# Patient Record
Sex: FEMALE | Race: WHITE | Employment: OTHER | URBAN - METROPOLITAN AREA
[De-identification: names, ages, dates, MRNs, and addresses within clinical notes are randomized per-mention and may not be internally consistent; named-entity substitution may affect disease eponyms.]

---

## 2017-02-21 ENCOUNTER — HOSPITAL ENCOUNTER (OUTPATIENT)
Dept: WOMENS IMAGING | Age: 77
Discharge: HOME | End: 2017-02-21
Payer: COMMERCIAL

## 2017-02-21 DIAGNOSIS — Z13.820 OSTEOPOROSIS SCREENING: ICD-10-CM

## 2017-02-21 DIAGNOSIS — Z12.39 SCREENING BREAST EXAMINATION: ICD-10-CM

## 2017-02-21 PROCEDURE — 77080 DXA BONE DENSITY AXIAL: CPT

## 2017-02-21 PROCEDURE — G0202 SCR MAMMO BI INCL CAD: HCPCS

## 2018-04-05 ENCOUNTER — HOSPITAL ENCOUNTER (OUTPATIENT)
Dept: WOMENS IMAGING | Age: 78
Discharge: HOME | End: 2018-04-07
Payer: COMMERCIAL

## 2018-04-05 DIAGNOSIS — Z12.31 ENCOUNTER FOR SCREENING MAMMOGRAM FOR BREAST CANCER: ICD-10-CM

## 2018-04-05 PROCEDURE — 77067 SCR MAMMO BI INCL CAD: CPT

## 2019-02-19 PROBLEM — M51.369 DDD (DEGENERATIVE DISC DISEASE), LUMBAR: Status: ACTIVE | Noted: 2019-02-19

## 2019-02-19 PROBLEM — M47.817 LUMBOSACRAL SPONDYLOSIS WITHOUT MYELOPATHY: Status: ACTIVE | Noted: 2019-02-19

## 2019-02-19 PROBLEM — M51.36 DDD (DEGENERATIVE DISC DISEASE), LUMBAR: Status: ACTIVE | Noted: 2019-02-19

## 2019-02-19 PROBLEM — M47.899 FACET SYNDROME: Status: ACTIVE | Noted: 2019-02-19

## 2019-10-02 ENCOUNTER — HOSPITAL ENCOUNTER (OUTPATIENT)
Dept: WOMENS IMAGING | Age: 79
Discharge: HOME | End: 2019-10-04
Payer: COMMERCIAL

## 2019-10-02 DIAGNOSIS — Z12.31 ENCOUNTER FOR SCREENING MAMMOGRAM FOR BREAST CANCER: ICD-10-CM

## 2019-10-02 PROCEDURE — 77067 SCR MAMMO BI INCL CAD: CPT

## 2021-10-06 ENCOUNTER — HOSPITAL ENCOUNTER (OUTPATIENT)
Dept: WOMENS IMAGING | Age: 81
Discharge: HOME | End: 2021-10-08
Payer: COMMERCIAL

## 2021-10-06 VITALS — BODY MASS INDEX: 30.73 KG/M2 | HEIGHT: 64 IN

## 2021-10-06 DIAGNOSIS — Z12.31 SCREENING MAMMOGRAM FOR HIGH-RISK PATIENT: ICD-10-CM

## 2021-10-06 DIAGNOSIS — Z12.31 BREAST CANCER SCREENING BY MAMMOGRAM: ICD-10-CM

## 2021-10-06 PROCEDURE — 77063 BREAST TOMOSYNTHESIS BI: CPT

## 2021-10-11 ENCOUNTER — HOSPITAL ENCOUNTER (OUTPATIENT)
Dept: ULTRASOUND IMAGING | Age: 81
Discharge: HOME | End: 2021-10-13
Payer: COMMERCIAL

## 2021-10-11 ENCOUNTER — HOSPITAL ENCOUNTER (OUTPATIENT)
Dept: WOMENS IMAGING | Age: 81
Discharge: HOME | End: 2021-10-13
Payer: COMMERCIAL

## 2021-10-11 VITALS — HEIGHT: 64 IN | BODY MASS INDEX: 30.73 KG/M2

## 2021-10-11 DIAGNOSIS — R92.8 ABNORMAL MAMMOGRAM: ICD-10-CM

## 2021-10-11 PROCEDURE — 76642 ULTRASOUND BREAST LIMITED: CPT

## 2021-10-11 PROCEDURE — G0279 TOMOSYNTHESIS, MAMMO: HCPCS

## 2021-10-12 ENCOUNTER — OFFICE VISIT (OUTPATIENT)
Dept: SURGERY | Age: 81
End: 2021-10-12
Payer: COMMERCIAL

## 2021-10-12 VITALS
HEIGHT: 64 IN | TEMPERATURE: 96.8 F | DIASTOLIC BLOOD PRESSURE: 88 MMHG | WEIGHT: 188.4 LBS | RESPIRATION RATE: 18 BRPM | HEART RATE: 98 BPM | SYSTOLIC BLOOD PRESSURE: 162 MMHG | BODY MASS INDEX: 32.17 KG/M2

## 2021-10-12 DIAGNOSIS — E66.9 OBESITY, CLASS I, BMI 30-34.9: ICD-10-CM

## 2021-10-12 DIAGNOSIS — R92.8 ABNORMAL MAMMOGRAM OF RIGHT BREAST: Primary | ICD-10-CM

## 2021-10-12 DIAGNOSIS — N63.10 BREAST MASS, RIGHT: ICD-10-CM

## 2021-10-12 DIAGNOSIS — Z80.3 FAMILY HISTORY OF BREAST CANCER IN SISTER: ICD-10-CM

## 2021-10-12 PROBLEM — E66.811 OBESITY, CLASS I, BMI 30-34.9: Status: ACTIVE | Noted: 2021-10-12

## 2021-10-12 PROCEDURE — 99204 OFFICE O/P NEW MOD 45 MIN: CPT | Performed by: SURGERY

## 2021-10-12 PROCEDURE — 19083 BX BREAST 1ST LESION US IMAG: CPT | Performed by: SURGERY

## 2021-10-12 RX ORDER — LIDOCAINE HYDROCHLORIDE 10 MG/ML
10 INJECTION, SOLUTION INFILTRATION; PERINEURAL ONCE
Status: SHIPPED | OUTPATIENT
Start: 2021-10-12

## 2021-10-12 RX ORDER — EZETIMIBE 10 MG/1
10 TABLET ORAL DAILY
COMMUNITY
Start: 2021-01-29

## 2021-10-12 ASSESSMENT — ENCOUNTER SYMPTOMS
CHEST TIGHTNESS: 0
SHORTNESS OF BREATH: 0
COLOR CHANGE: 0
VOMITING: 0
NAUSEA: 0
COUGH: 0
ABDOMINAL PAIN: 0
SORE THROAT: 0

## 2021-10-12 NOTE — PROGRESS NOTES
NEW BREAST PATIENT         SERVICE DATE: 10/12/21  SERVICE TIME:  10:00 AM EDT    REFERRED BY:  Gamaliel Diaz MD  REASON FOR TODAY'S VISIT:    Chief Complaint   Patient presents with    New Patient    Abnormal Mammogram     BIRADS 5 Right Breast, denies feeling any changes , skin changes, nipple drainage or breast pain bilateral breasts    Abnormal Ultrasound      CHAPERONE WAS OFFERED, PATIENT RESPONDED: no    HISTORY AND CHIEF COMPLAINT:  Mariaa Claros is a 68 y.o.  female who is here for a New Patient, Abnormal Mammogram (BIRADS 5 Right Breast, denies feeling any changes , skin changes, nipple drainage or breast pain bilateral breasts), and Abnormal Ultrasound  she is nervous as her sister has advanced breast cancer. BREAST HISTORY  Her past breast history (prior to this encounter) is as follows: Abnormal mammogram:   Yes, BIRADS 5 Right Breast  Abnormal Breast US:  Yes, BIRADS 5 Right Breast  Breast biopsy:    No  Breast cysts:    No  Breast surgery:    No  Breast cancer              No  History of Breastfeeding: No   Currently Breastfeeding: No      RISK FACTORS FOR BREAST CANCER:  Family History of Breast Cancer: Yes, significant for Sister DX age 67.   History of ovarian cancer: no  Ashkenazi Ancestry: no  Age at the birth of first child: 27  Age at the onset of menses: 14-15  Age at menopause: 48   Hormonal therapy: yes - about 30 years ago for about 6-8 months took Estrace  Postmenopausal obesity: yes, BMI 32.34    BRA SIZE: 38D    Past Medical History:   Diagnosis Date    Chronic back pain     Urinary incontinence      Past Surgical History:   Procedure Laterality Date    SPINE SURGERY       Family History   Problem Relation Age of Onset    Cancer Father     Diabetes Father     Breast Cancer Sister      Social History     Socioeconomic History    Marital status:      Spouse name: Not on file    Number of children: Not on file    Years of education: Not on file   Ramy Dominguez Highest education level: Not on file   Occupational History    Not on file   Tobacco Use    Smoking status: Former Smoker    Smokeless tobacco: Never Used   Vaping Use    Vaping Use: Never used   Substance and Sexual Activity    Alcohol use: No    Drug use: No    Sexual activity: Not on file   Other Topics Concern    Not on file   Social History Narrative    Not on file     Social Determinants of Health     Financial Resource Strain:     Difficulty of Paying Living Expenses:    Food Insecurity:     Worried About Running Out of Food in the Last Year:     [address] in the Last Year:    Transportation Needs:     Lack of Transportation (Medical):  Lack of Transportation (Non-Medical):    Physical Activity:     Days of Exercise per Week:     Minutes of Exercise per Session:    Stress:     Feeling of Stress :    Social Connections:     Frequency of Communication with Friends and Family:     Frequency of Social Gatherings with Friends and Family:     Attends Worship Services:     Active Member of Clubs or Organizations:     Attends Club or Organization Meetings:     Marital Status:    Intimate Partner Violence:     Fear of Current or Ex-Partner:     Emotionally Abused:     Physically Abused:     Sexually Abused:        Review of Systems   Constitutional: Negative for activity change, appetite change, chills, diaphoresis, fatigue, fever and unexpected weight change. HENT: Negative for congestion, ear pain, hearing loss, mouth sores, nosebleeds and sore throat. Respiratory: Negative for cough, chest tightness and shortness of breath. Cardiovascular: Negative for chest pain, palpitations and leg swelling. Gastrointestinal: Negative for abdominal pain, nausea and vomiting. Endocrine: Negative for cold intolerance, heat intolerance, polydipsia, polyphagia and polyuria. Genitourinary: Negative for difficulty urinating, menstrual problem and vaginal bleeding.    Musculoskeletal: Negative for neck pain and neck stiffness. Skin: Negative for color change, pallor, rash and wound. Allergic/Immunologic: Negative for environmental allergies and immunocompromised state. Neurological: Negative for weakness. Hematological: Does not bruise/bleed easily. Psychiatric/Behavioral: Negative for agitation, confusion, sleep disturbance and suicidal ideas. The patient is nervous/anxious (about procedure and results). Have you ever tested positive for AIDS? no  Have you ever tested positive for Hepatitis? 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 She has never used smokeless tobacco.  Alcohol use: Luis Fernando Swartz  reports no history of alcohol use. Drug use: Luis Fernando Swartz  reports no history of drug use. Caffeine intake: 1-2 cups of caffeinated coffee per day(s)  Exercise: Works in yard    BP (!) 162/88   Pulse 98   Temp 96.8 °F (36 °C)   Resp 18   Ht 5' 4\" (1.626 m)   Wt 188 lb 6.4 oz (85.5 kg)   BMI 32.34 kg/m²     Physical Exam  Vitals reviewed. Constitutional:       Appearance: Normal appearance. She is well-developed. HENT:      Head: Normocephalic and atraumatic. Nose: Nose normal.   Eyes:      Conjunctiva/sclera: Conjunctivae normal.      Right eye: No hemorrhage. Left eye: No hemorrhage. Cardiovascular:      Rate and Rhythm: Normal rate. Pulmonary:      Effort: Pulmonary effort is normal. No respiratory distress. Chest:      Breasts: Breasts are symmetrical.         Right: Mass present. No inverted nipple, nipple discharge (slight thickening in uoq, no discrete mass), skin change or tenderness. Left: No inverted nipple, mass, nipple discharge, skin change or tenderness. Musculoskeletal:         General: Normal range of motion. Cervical back: Normal range of motion and neck supple. Comments: Normal Range of motion in upper and lower extremities. Lymphadenopathy:      Cervical: No cervical adenopathy. Right cervical: No superficial, deep or posterior cervical adenopathy. Left cervical: No superficial, deep or posterior cervical adenopathy. Upper Body:      Right upper body: No supraclavicular, axillary or pectoral adenopathy. Left upper body: No supraclavicular, axillary or pectoral adenopathy. Skin:     General: Skin is warm and dry. Findings: No abrasion, bruising, erythema or lesion. Neurological:      Mental Status: She is alert and oriented to person, place, and time. She is not disoriented. Psychiatric:         Mood and Affect: Mood is anxious. Speech: Speech normal.         Behavior: Behavior normal. Behavior is cooperative. Thought Content: Thought content normal.         Judgment: Judgment normal.       RADIOGRAPHIC FINDINGS:    US BREAST LIMITED RIGHT    Result Date: 10/11/2021  EXAMINATION: RIGHT DIGITAL DIAGNOSTIC MAMMOGRAM AND TARGETED RIGHT BREAST SONOGRAM CLINICAL HISTORY:  ABNORMAL SCREENING MAMMOGRAM COMPARISON:  OCTOBER 6, 2021, OCTOBER 2, 2019. FINDINGS: 2-D and 3-D additional sequences included spot compression views in the CC and MLO projection and a 90 degree mediolateral view of the entire right breast. A targeted sonogram was included Nodular density approximately 8 mm in diameter in the outer right breast persisted with additional images. This lies in the upper outer quadrant on the mammogram. Targeted right breast sonogram revealed a 6 x 5 x 4 mm hypoechoic irregular spiculated mass along the 9:00 axis, 6 cm from the nipple. There is shadowing posterior to the mass sonographically. This is highly suspicious for tumor and ultrasound-guided biopsy is recommended. CAD analysis was performed and used in the interpretation. BI-RADS 5: HIGHLY SUGGESTIVE OF MALIGNANCY--APPROPRIATE ACTION SHOULD BE TAKEN.  Board Certified Radiologists. Accredited by the ACR and FDA. MAMMOGRAPHY IS VERY IMPORTANT TO YOUR HEALTH. THE AMERICAN CANCER SOCIETY GUIDELINES RECOMMEND THAT WOMEN 36YEARS OF AGE AND OLDER SHOULD HAVE A MAMMOGRAM EVERY YEAR. A REMINDER LETTER WILL BE SENT AT THE APPROPRIATE TIME. Board Certified Radiologists. Accredited by the ACR and FDA. MAMMOGRAPHY IS VERY IMPORTANT TO YOUR HEALTH. THE AMERICAN CANCER SOCIETY GUIDELINES RECOMMEND THAT WOMEN 36YEARS OF AGE AND OLDER SHOULD HAVE A MAMMOGRAM EVERY YEAR. A REMINDER LETTER WILL BE SENT AT THE APPROPRIATE TIME.      [facility] DIGITAL SCREEN BILATERAL    Result Date: 10/7/2021  EXAMINATION: [facility] DIGITAL SCREEN BILATERAL CLINICAL HISTORY:Z12.31 BREAST CANCER SCREENING BY MAMMOGRAM ICD10 COMPARISON: OCTOBER 2, 2019 FINDINGS:3-D tomosynthesis imaging of the bilateral breasts was performed. There are scattered fibroglandular densities within each breast.  Focal asymmetric mass in the upper outer right breast. Additional mammographic imaging and correlation with ultrasound recommended. . Otherwise there are no suspicious masses, areas of architectural distortion or suspicious areas of microcalcifications. CAD analysis was performed and used in the interpretation. BI-RADS 0: NEED ADDITIONAL IMAGING EVALUATION. Board Certified Radiologists. Accredited by the ACR and FDA. MAMMOGRAPHY IS VERY IMPORTANT TO YOUR HEALTH. THE AMERICAN CANCER SOCIETY GUIDELINES RECOMMEND THAT WOMEN 36YEARS OF AGE AND OLDER SHOULD HAVE A MAMMOGRAM EVERY YEAR. A REMINDER LETTER WILL BE SENT AT THE APPROPRIATE TIME.  THIS FACILITY UTILIZES A REMINDER SYSTEM TO ENSURE ALL PATIENTS RECEIVE REMINDER NOTIFICATIONS AT THE APPROPRIATE TIME BASED ON THE RECOMMENDATIONS OF THIS EXAM. THIS INCLUDES REMINDERS FOR ROUTINE  SCREENING MAMMOGRAMS, DIAGNOSTIC MAMMOGRAMS IN WHICH THE PATIENT IS ASKED TO RETURN FOR ADDITIONAL VIEWS, OR OTHER BREAST IMAGING INTERVENTIONS WHEN APPROPRIATE.  THE PATIENT WILL BE PLACED IN THE APPROPRIATE REMINDER SYSTEM INCLUDING A REMINDER AT THE APPROPRIATE TIME FOR ANY PENDING ADDITIONAL VIEWS. [facility] DIGITAL DIAGNOSTIC UNILATERAL RIGHT    Result Date: 10/11/2021  EXAMINATION: RIGHT DIGITAL DIAGNOSTIC MAMMOGRAM AND TARGETED RIGHT BREAST SONOGRAM CLINICAL HISTORY:  ABNORMAL SCREENING MAMMOGRAM COMPARISON:  OCTOBER 6, 2021, OCTOBER 2, 2019. FINDINGS: 2-D and 3-D additional sequences included spot compression views in the CC and MLO projection and a 90 degree mediolateral view of the entire right breast. A targeted sonogram was included Nodular density approximately 8 mm in diameter in the outer right breast persisted with additional images. This lies in the upper outer quadrant on the mammogram. Targeted right breast sonogram revealed a 6 x 5 x 4 mm hypoechoic irregular spiculated mass along the 9:00 axis, 6 cm from the nipple. There is shadowing posterior to the mass sonographically. This is highly suspicious for tumor and ultrasound-guided biopsy is recommended. CAD analysis was performed and used in the interpretation. BI-RADS 5: HIGHLY SUGGESTIVE OF MALIGNANCY--APPROPRIATE ACTION SHOULD BE TAKEN. Board Certified Radiologists. Accredited by the ACR and FDA. MAMMOGRAPHY IS VERY IMPORTANT TO YOUR HEALTH. THE AMERICAN CANCER SOCIETY GUIDELINES RECOMMEND THAT WOMEN 36YEARS OF AGE AND OLDER SHOULD HAVE A MAMMOGRAM EVERY YEAR. A REMINDER LETTER WILL BE SENT AT THE APPROPRIATE TIME. Board Certified Radiologists. Accredited by the ACR and FDA. MAMMOGRAPHY IS VERY IMPORTANT TO YOUR HEALTH. THE AMERICAN CANCER SOCIETY GUIDELINES RECOMMEND THAT WOMEN 36YEARS OF AGE AND OLDER SHOULD HAVE A MAMMOGRAM EVERY YEAR. A REMINDER LETTER WILL BE SENT AT THE APPROPRIATE TIME. ASSESSMENT     IMPRESSION :      ICD-10-CM    1. Abnormal mammogram of right breast  R92.8 Surgical Pathology     lidocaine 1 % injection 10 mL     sodium bicarbonate 8.4 % injection 3 mEq   2.  Breast mass, right N63. 10    3. 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 Family history of breast cancer in sister  Z80.2         PLAN:  We discussed the clinical exam and the imaging findings. I recommended an ultrasound guided fine needle aspiration and possible core needle biopsy and possible clip placement. If the lesion is found to be solid or more tissue is needed for diagnosis an ultrasound guided core biopsy will be performed with the need for follow-up versus full removal of the lesion in the Operating Room. She was told that 95% of the time we can obtain a diagnosis. If the results are non-diagnostic she may require a more extensive biopsy. The patient agreed to undergo the ultrasound guided fine needle aspiration and biopsy in the office setting understanding the risks of bleeding and infection. See ultrasound report. Ultrasound Guided Right Breast Core Needle Biopsy and Clip Placement    PATIENT:  Russell Bolden     DATE: 10/12/2021    :      1940     INDICATION:  Right Mammogram Abnormality, Ultrasound Abnormality, Breast Mass and Palpable area of Concern    LOCATION:   9 o'clock 6 cm from nipple    DESCRIPTION:    A timeout was performed immediately prior to the start of the Ultrasound Guided Right Breast Core Needle Biopsy and Clip Placement procedure and included the correct patient (two identifiers), correct procedure and correct site(s). Procedure consent and allergies were also verified. The patient understood the risks and benefits of the procedure and consented on the day of her visit. The ultrasound probe was placed over the suspicious lesion. The skin was prepped with chlorhexidine. 13 cc of lidocaine/bicarb mixture was used to anesthetize the skin and breast.  An 11 scalpel was used to make a small quarter inch incision. 5 18-gauge Achieve core needle biopsies were performed, laterally to medially. The samples were placed in formalin. A clip was placed under ultrasound guidance. Hemostasis was obtained. The wound was cleaned. Steri-strips applied. Five minutes of pressure was held. The patient tolerated the procedure well. IMPRESSION: Successful Ultrasound Guided Right Breast Core Needle Biopsy and Clip Placement    Category 5    Dictated by:  Timi Richardson MD, FACS 10/12/2021   Orders Placed This Encounter   Procedures    Surgical Pathology     Standing Status:   Future     Standing Expiration Date:   10/12/2022     Order Specific Question:   PREVIOUS BIOPSY     Answer:   No     Order Specific Question:   PREOP DIAGNOSIS     Answer:   abnormal right breast mammogram     Order Specific Question:   FROZEN SECTION - NO OR YES/SPECIMEN     Answer:   No      Orders Placed This Encounter   Medications    lidocaine 1 % injection 10 mL    sodium bicarbonate 8.4 % injection 3 mEq           Total face to face time was 45 minutes with greater than 50% spent on counseling the patient or coordinating her care. Primo Felipe MD    CC: Summer Alcaraz MD    The chief complaint, extensive medical and family history, and review of systems were asked and reviewed with the patient by me. I also reviewed the note in detail. This note was partially generated using Dragon voice recognition system, and there may be some incorrect words, spellings, punctuation that were not noticed in checking the note before saving.

## 2021-10-14 ENCOUNTER — TELEPHONE (OUTPATIENT)
Dept: SURGERY | Age: 81
End: 2021-10-14

## 2021-10-14 NOTE — TELEPHONE ENCOUNTER
I called the patient post Right Breast Core Biopsy. LMOM for the patient to return a call to the office with any questions or concerns.

## 2021-10-15 ENCOUNTER — TELEPHONE (OUTPATIENT)
Dept: SURGERY | Age: 81
End: 2021-10-15

## 2021-10-15 NOTE — TELEPHONE ENCOUNTER
PATIENT:  Annabella Huff    DATE:     10/15/2021    TELEPHONE CONVERSATION:    Annabella Huff was called regarding pathology results.     Dictated by:  Raul Early MD  10/15/2021

## 2021-10-19 ENCOUNTER — OFFICE VISIT (OUTPATIENT)
Dept: SURGERY | Age: 81
End: 2021-10-19
Payer: COMMERCIAL

## 2021-10-19 VITALS
RESPIRATION RATE: 16 BRPM | SYSTOLIC BLOOD PRESSURE: 150 MMHG | HEART RATE: 88 BPM | WEIGHT: 188.8 LBS | HEIGHT: 64 IN | BODY MASS INDEX: 32.23 KG/M2 | DIASTOLIC BLOOD PRESSURE: 82 MMHG

## 2021-10-19 DIAGNOSIS — C50.411 CARCINOMA OF UPPER-OUTER QUADRANT OF RIGHT BREAST IN FEMALE, ESTROGEN RECEPTOR NEGATIVE (HCC): Primary | ICD-10-CM

## 2021-10-19 DIAGNOSIS — Z17.1 CARCINOMA OF UPPER-OUTER QUADRANT OF RIGHT BREAST IN FEMALE, ESTROGEN RECEPTOR NEGATIVE (HCC): Primary | ICD-10-CM

## 2021-10-19 DIAGNOSIS — Z80.3 FAMILY HISTORY OF BREAST CANCER IN SISTER: ICD-10-CM

## 2021-10-19 DIAGNOSIS — E66.9 OBESITY, CLASS I, BMI 30-34.9: ICD-10-CM

## 2021-10-19 PROCEDURE — 99215 OFFICE O/P EST HI 40 MIN: CPT | Performed by: SURGERY

## 2021-10-19 RX ORDER — CEPHALEXIN 500 MG/1
1 TABLET ORAL 2 TIMES DAILY
COMMUNITY
Start: 2021-10-15 | End: 2021-11-22

## 2021-10-19 NOTE — PROGRESS NOTES
CANCER TALK    PATIENT:  Armaan Babin    DATE:     10/19/21    SURGICAL DISCUSSION:    Noe Shaver is a 68y.o. year old  female who presents for a discussion right breast cancer. We underwent a description of the pathology of her tumor, the clinical stage, her treatment options of breast conservation versus simple mastectomy and sentinel lymph node biopsy. These were all discussed with her. The patient is aware that by having a sentinel lymph node if the sentinel lymph node is negative on frozen section and the final pathology is positive she will need to return to the Operating Room. She is also aware there is a 5% chance that the sentinel lymph node may not be identified at surgery and that she may need to have a completion axillary dissection. There is a 3-5% chance of a false/negative finding on sentinel lymph node biopsy. The indications for chemotherapy and radiation therapy were also discussed. The patient has undergone explanation of the complications of the procedures which are including but not limited to bleeding, infection, nerve injury and lymphedema. The variation in lymphedema rates between sentinel lymph node biopsy and the completion axillary dissection were discussed. The patient is aware that if she meets all the criteria of having clear margins that the survival and local recurrence rate for mastectomy and breast conservation are the same. The potential risk of local recurrence is 5-6%. She is aware that if her margins return positive on the lumpectomy specimen that she would need to have either a re-excision for margins or a completion mastectomy. Multiple questions were answered and the patient wanted to schedule a right breast lumpectomy and sentinel lymph node biopsy at the next available time.      GENETIC COUNSELING RISK ASSESSMENT, DISCUSSION, AND SUGGESTED FOLLOW UP:    We reviewed the natural history and genetic etiology of sporadic, familial and hereditary cancer syndromes. The patient's personal and family history is potentially suggestive of: Hereditary Breast and Ovarian Cancer Syndrome. The patient meets NCCN HBOC testing criteria based on her personal and family history. Her personal history of estrogen negative tumor and family history of estrogen negative. We discussed that identification of a hereditary cancer syndrome may help her care providers tailor the patients medical management. If a mutation indicating Hereditary Breast and Ovarian Cancer Syndrome is detected in this case, the [address] [place] [address] recommendations would include increased cancer surveillance and prophylactic surgery options. If a mutation is detected, the patient will be referred back to the referring provider and to any additional appropriate care providers to discuss the relevant options. Inheritance of hereditary cancer syndromes was discussed with the patient. If a mutation is not found in the patient, this will decrease the likelihood of Hereditary Breast and Ovarian Cancer Syndrome as the explanation for her personal history. Cancer surveillance options would be discussed for the patient according to the appropriate standard [facility] guidelines, with consideration of their personal and family history risk factors. In this case, the patient will be referred back to their care providers for discussions of management. The patient was offered [facility] Hereditary Cancer test, BRCA 1 and BRCA 2 with 38 genes for Multi-Cancer. After considering the risks, benefits, and limitations, the patient chose to pursue and provided informed consent for the following testing:  [facility] Hereditary Cancer test, BRCA 1 and BRCA 2 with 38 genes for Multi-Cancer. Greater than 50% of the time spent was reviewing management options with the patient.  Approximately 75 minutes was spent with the patient and family. IMPRESSION:    Cancer Staging  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, estrogen receptor negative ([facility] [address]  Staging form: Breast, AJCC 8th Edition  - Clinical stage from 10/12/2021: cT1, cN0, cM0, G3, ER-, WV-, HER2: Unknown - Signed by Ulises Martini MD on 10/19/2021        Diagnosis Orders   1. 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, estrogen receptor negative ([facility] [address]     2. Obesity, Class I, BMI 30-34.9     3. Family history of breast cancer in sister        Consultations to    Plastic Surgery Yes, But Patient declined  Radiation/Oncology Yes   Medical/Oncology  Yes  Genetic Counseling Yes  Fertility issues  Yes    Yes  Psychology No  Nutrition counseling given    Dictated by:  Gus Reid MD 10/19/21      Cc: MD Dr. Jeanette Orlando    This note was partially generated using Dragon voice recognition system, and there may be some incorrect words, spellings, punctuation that were not noticed in checking the note before saving.

## 2021-10-25 ENCOUNTER — TELEPHONE (OUTPATIENT)
Dept: SURGERY | Age: 81
End: 2021-10-25

## 2021-10-25 NOTE — TELEPHONE ENCOUNTER
I was calling the patient with Negative [id number] results and negative genetics results. LMOM for the patient to return a call to the office.

## 2021-10-25 NOTE — TELEPHONE ENCOUNTER
----- Message from Quirino Montoya MD sent at 10/25/2021 12:22 PM EDT -----  Regarding: tell her today her 2 is neg    ----- Message -----  From: Juana Mondragon Incoming Lab Results From [facility]  Sent: 10/14/2021   2:13 PM EDT  To: MD Quirino Heart MD   Sent: Mon October 25, 2021 12:27 PM   To: Chalo Guaman, RAPHAEL          Message    Tell her about neg genetics   ----- Message -----

## 2021-10-25 NOTE — TELEPHONE ENCOUNTER
I notified the patient of her Negative [id number] results. Patient was also informed that her genetic test results are negative for a deleterious mutation. The patient is aware to continue the current treatment / follow up plan as previously recommended by Dr. Kamran Grande. She will receive a copy of the test results in the mail. In the information packet, there is information to contact a genetic counselor. This is free of charge and Dr. Kamran Grande highly recommends all her patients to contact them. Please call the office at [phone] with any questions. The patient verbalized understanding of the negative [id number] results and Negative Genetics results and has no questions at this time. I verified date/time/location of scheduled appointments on 10/26/2021 for pre-operative testing, teaching and OT pre-hab with the patient.

## 2021-10-26 ENCOUNTER — OFFICE VISIT (OUTPATIENT)
Dept: FAMILY MEDICINE CLINIC | Age: 81
End: 2021-10-26
Payer: COMMERCIAL

## 2021-10-26 ENCOUNTER — HOSPITAL ENCOUNTER (OUTPATIENT)
Dept: OCCUPATIONAL THERAPY | Age: 81
Setting detail: THERAPIES SERIES
Discharge: HOME | End: 2021-10-26
Payer: COMMERCIAL

## 2021-10-26 ENCOUNTER — SOCIAL WORK (OUTPATIENT)
Dept: RADIATION ONCOLOGY | Age: 81
End: 2021-10-26

## 2021-10-26 ENCOUNTER — NURSE ONLY (OUTPATIENT)
Dept: SURGERY | Age: 81
End: 2021-10-26
Payer: MEDICARE

## 2021-10-26 VITALS
WEIGHT: 185 LBS | SYSTOLIC BLOOD PRESSURE: 140 MMHG | HEART RATE: 83 BPM | TEMPERATURE: 95.7 F | DIASTOLIC BLOOD PRESSURE: 80 MMHG | HEIGHT: 64 IN | OXYGEN SATURATION: 98 % | BODY MASS INDEX: 31.58 KG/M2

## 2021-10-26 VITALS
BODY MASS INDEX: 31.41 KG/M2 | WEIGHT: 184 LBS | HEART RATE: 84 BPM | SYSTOLIC BLOOD PRESSURE: 169 MMHG | DIASTOLIC BLOOD PRESSURE: 87 MMHG | HEIGHT: 64 IN | RESPIRATION RATE: 14 BRPM

## 2021-10-26 DIAGNOSIS — C50.411 CARCINOMA OF UPPER-OUTER QUADRANT OF RIGHT BREAST IN FEMALE, ESTROGEN RECEPTOR NEGATIVE (HCC): ICD-10-CM

## 2021-10-26 DIAGNOSIS — Z01.818 PREOP EXAMINATION: ICD-10-CM

## 2021-10-26 DIAGNOSIS — R52 PAIN AT INJECTION SITE, INITIAL ENCOUNTER: Primary | ICD-10-CM

## 2021-10-26 DIAGNOSIS — Z01.818 PREOP EXAMINATION: Primary | ICD-10-CM

## 2021-10-26 DIAGNOSIS — Z17.1 CARCINOMA OF UPPER-OUTER QUADRANT OF RIGHT BREAST IN FEMALE, ESTROGEN RECEPTOR NEGATIVE (HCC): ICD-10-CM

## 2021-10-26 DIAGNOSIS — T80.89XA PAIN AT INJECTION SITE, INITIAL ENCOUNTER: Primary | ICD-10-CM

## 2021-10-26 LAB
ANION GAP SERPL CALCULATED.3IONS-SCNC: 12 MEQ/L (ref 9–15)
BASOPHILS ABSOLUTE: 0.1 K/UL (ref 0–0.2)
BASOPHILS RELATIVE PERCENT: 0.9 %
BUN BLDV-MCNC: 20 MG/DL (ref 8–23)
CALCIUM SERPL-MCNC: 9.5 MG/DL (ref 8.5–9.9)
CHLORIDE BLD-SCNC: 101 MEQ/L (ref 95–107)
CO2: 26 MEQ/L (ref 20–31)
CREAT SERPL-MCNC: 0.91 MG/DL (ref 0.5–0.9)
EOSINOPHILS ABSOLUTE: 0.1 K/UL (ref 0–0.7)
EOSINOPHILS RELATIVE PERCENT: 1.3 %
GFR AFRICAN AMERICAN: >60
GFR NON-AFRICAN AMERICAN: 59.7
GLUCOSE BLD-MCNC: 113 MG/DL (ref 70–99)
HCT VFR BLD CALC: 41.2 % (ref 37–47)
HEMOGLOBIN: 13.8 G/DL (ref 12–16)
LYMPHOCYTES ABSOLUTE: 2.4 K/UL (ref 1–4.8)
LYMPHOCYTES RELATIVE PERCENT: 41.1 %
MCH RBC QN AUTO: 29.9 PG (ref 27–31.3)
MCHC RBC AUTO-ENTMCNC: 33.5 % (ref 33–37)
MCV RBC AUTO: 89.3 FL (ref 82–100)
MONOCYTES ABSOLUTE: 0.5 K/UL (ref 0.2–0.8)
MONOCYTES RELATIVE PERCENT: 9.1 %
NEUTROPHILS ABSOLUTE: 2.8 K/UL (ref 1.4–6.5)
NEUTROPHILS RELATIVE PERCENT: 47.6 %
PDW BLD-RTO: 13.2 % (ref 11.5–14.5)
PLATELET # BLD: 226 K/UL (ref 130–400)
POTASSIUM SERPL-SCNC: 4.7 MEQ/L (ref 3.4–4.9)
RBC # BLD: 4.62 M/UL (ref 4.2–5.4)
SODIUM BLD-SCNC: 139 MEQ/L (ref 135–144)
WBC # BLD: 5.8 K/UL (ref 4.8–10.8)

## 2021-10-26 PROCEDURE — 97166 OT EVAL MOD COMPLEX 45 MIN: CPT

## 2021-10-26 PROCEDURE — 93000 ELECTROCARDIOGRAM COMPLETE: CPT | Performed by: PHYSICIAN ASSISTANT

## 2021-10-26 PROCEDURE — 99213 OFFICE O/P EST LOW 20 MIN: CPT | Performed by: PHYSICIAN ASSISTANT

## 2021-10-26 RX ORDER — LIDOCAINE AND PRILOCAINE 25; 25 MG/G; MG/G
CREAM TOPICAL
Qty: 1 EACH | Refills: 0 | Status: ON HOLD | OUTPATIENT
Start: 2021-10-26 | End: 2021-10-28 | Stop reason: HOSPADM

## 2021-10-26 SDOH — ECONOMIC STABILITY: TRANSPORTATION INSECURITY
IN THE PAST 12 MONTHS, HAS THE LACK OF TRANSPORTATION KEPT YOU FROM MEDICAL APPOINTMENTS OR FROM GETTING MEDICATIONS?: NO

## 2021-10-26 SDOH — ECONOMIC STABILITY: TRANSPORTATION INSECURITY
IN THE PAST 12 MONTHS, HAS LACK OF TRANSPORTATION KEPT YOU FROM MEETINGS, WORK, OR FROM GETTING THINGS NEEDED FOR DAILY LIVING?: NO

## 2021-10-26 SDOH — ECONOMIC STABILITY: FOOD INSECURITY: WITHIN THE PAST 12 MONTHS, THE FOOD YOU BOUGHT JUST DIDN'T LAST AND YOU DIDN'T HAVE MONEY TO GET MORE.: NEVER TRUE

## 2021-10-26 SDOH — ECONOMIC STABILITY: FOOD INSECURITY: WITHIN THE PAST 12 MONTHS, YOU WORRIED THAT YOUR FOOD WOULD RUN OUT BEFORE YOU GOT MONEY TO BUY MORE.: NEVER TRUE

## 2021-10-26 ASSESSMENT — PATIENT HEALTH QUESTIONNAIRE - PHQ9
SUM OF ALL RESPONSES TO PHQ QUESTIONS 1-9: 0
SUM OF ALL RESPONSES TO PHQ QUESTIONS 1-9: 0
SUM OF ALL RESPONSES TO PHQ9 QUESTIONS 1 & 2: 0
2. FEELING DOWN, DEPRESSED OR HOPELESS: 0
SUM OF ALL RESPONSES TO PHQ QUESTIONS 1-9: 0
1. LITTLE INTEREST OR PLEASURE IN DOING THINGS: 0

## 2021-10-26 ASSESSMENT — SOCIAL DETERMINANTS OF HEALTH (SDOH): HOW HARD IS IT FOR YOU TO PAY FOR THE VERY BASICS LIKE FOOD, HOUSING, MEDICAL CARE, AND HEATING?: NOT HARD AT ALL

## 2021-10-26 NOTE — PROGRESS NOTES
Preoperative Consultation      Annabella Huff  YOB: 1940    Date of Service:  10/26/2021    Vitals:    10/26/21 1009 10/26/21 1043   BP: (!) 160/90 (!) 140/80   Site: Left Upper Arm Left Upper Arm   Position: Sitting Sitting   Cuff Size: Medium Adult Medium Adult   Pulse: 83    Temp: 95.7 °F (35.4 °C)    TempSrc: Temporal    SpO2: 98%    Weight: 185 lb (83.9 kg)    Height: 5' 4\" (1.626 m)       Wt Readings from Last 2 Encounters:   10/26/21 185 lb (83.9 kg)   10/19/21 188 lb 12.8 oz (85.6 kg)     BP Readings from Last 3 Encounters:   10/26/21 (!) 140/80   10/19/21 (!) 150/82   10/12/21 (!) 162/88        Chief Complaint   Patient presents with   [facility] Pre-op Exam     Allergies   Allergen Reactions    Atorvastatin      Muscle pain    Clonidine      SKIN RX.  Fenofibrate Micronized      Muscle pain    Levofloxacin      Other reaction(s): Other: See Comments  ? tendonitis    Raloxifene      EVISTA - HOT FLASHES, MOOD SWINGS    Statins Other (See Comments)     muscle pain     Outpatient Medications Marked as Taking for the 10/26/21 encounter (Office Visit) with MALACHI Landaverde   Medication Sig Dispense Refill    Cephalexin 500 MG TABS Take 1 tablet by mouth 2 times daily      ezetimibe (ZETIA) 10 MG tablet Take 10 mg by mouth daily      calcium citrate-vitamin D (CITRACAL+D) 315-200 MG-UNIT per tablet Take 1 tablet by mouth      candesartan-hydrochlorothiazide (ATACAND HCT) 32-12.5 MG per tablet       tretinoin (RETIN-A) 0.1 % cream Apply sparingly at bedtime         This patient presents to the office today for a preoperative consultation at the request of surgeon, Dr. Jovana Millard, who plans on performing right breast lumpectomy and sentinel lymph node biopsy on October 28 at [facility]. The current problem began 2 months ago, and symptoms have been unchanged with time. Conservative therapy: N/A. Started on Keflex yesterday by Dr. Good Simpson for infection on the right first toe.  Patient is following up with Dr. Mey Lamas tomorrow. 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, estrogen receptor negative ([facility] [address]       Past Medical History:   Diagnosis Date    Chronic back pain     Urinary incontinence      Past Surgical History:   Procedure Laterality Date    SPINE SURGERY       Family History   Problem Relation Age of Onset    Cancer Father     Diabetes Father     Breast Cancer Sister      Social History     Socioeconomic History    Marital status:      Spouse name: Not on file    Number of children: Not on file    Years of education: Not on file    Highest education level: Not on file   Occupational History    Not on file   Tobacco Use    Smoking status: Former Smoker     Quit date:      Years since quittin.8    Smokeless tobacco: Never Used   Vaping Use    Vaping Use: Never used   Substance and Sexual Activity    Alcohol use: No    Drug use: No    Sexual activity: Not on file   Other Topics Concern    Not on file   Social History Narrative    Not on file     Social Determinants of Health     Financial Resource Strain: Low Risk     Difficulty of Paying Living Expenses: Not hard at all   Food Insecurity: No Food Insecurity    Worried About Running Out of Food in the Last Year: Never true    Wero of Food in the Last Year: Never true   Transportation Needs: No Transportation Needs    Lack of Transportation (Medical):  No    Lack of Transportation (Non-Medical): No   Physical Activity:     Days of Exercise per Week:     Minutes of Exercise per Session:    Stress:     Feeling of Stress :    Social Connections:     Frequency of Communication with Friends and Family:     Frequency of Social Gatherings with Friends and Family:     Attends Hoahaoism Services:     Active Member of Clubs or Organizations:     Attends Club or Organization Meetings:     Marital Status:    Intimate Partner Violence:     Fear of Current or Ex-Partner:     Emotionally Abused:     Physically Abused:     Sexually Abused:        Review of Systems  A comprehensive review of systems was negative except for what was noted in the HPI. Physical Exam   Constitutional: She is oriented to person, place, and time. She appears well-developed and well-nourished. No distress. HENT:   Head: Normocephalic and atraumatic. Mouth/Throat: Uvula is midline, oropharynx is clear and moist and mucous membranes are normal.   Eyes: Conjunctivae and EOM are normal. Pupils are equal, round, and reactive to light. Neck: Trachea normal and normal range of motion. Neck supple. No JVD present. Carotid bruit is not present. No mass and no thyromegaly present. Cardiovascular: Normal rate, regular rhythm, normal heart sounds and intact distal pulses. Exam reveals no gallop and no friction rub. No murmur heard. Pulmonary/Chest: Effort normal and breath sounds normal. No respiratory distress. She has no wheezes. She has no rales. Abdominal: Soft. Normal aorta and bowel sounds are normal. She exhibits no distension and no mass. There is no hepatosplenomegaly. No tenderness. Musculoskeletal: She exhibits no edema and no tenderness. Neurological: She is alert and oriented to person, place, and time. She has normal strength. No cranial nerve deficit or sensory deficit. Coordination and gait normal.   Skin: Skin is warm and dry. No rash noted. No erythema.    Psychiatric: She has a normal mood and affect. Her behavior is normal.     EKG Interpretation:  normal EKG, normal sinus rhythm, unchanged from previous tracings. Lab Review   No visits with results within 6 Month(s) from this visit. Latest known visit with results is:   No results found for any previous visit. No results found for: NA, K, CO2, BUN, CREATININE, GLUCOSE, CALCIUM  No results found for: CKTOTAL, CKMB, CKMBINDEX, TROPONINI  No results found for: WBC, HGB, HCT, MCV, PLT  No results found for: CHOL, TRIG, HDL, LDLDIRECT        Assessment:       68 y.o. patient with planned surgery as above. Known risk factors for perioperative complications: Hypertension  Current medications which may produce withdrawal symptoms if withheld perioperatively: none      Plan:     1. Preoperative workup as follows: ECG, hemoglobin, hematocrit, electrolytes, creatinine  2. Change in medication regimen before surgery: Hold all medications on morning of surgery  3. Prophylaxis for cardiac events with perioperative beta-blockers: Not indicated  ACC/AHA indications for pre-operative beta-blocker use:    · Vascular surgery with history of postitive stress test  · Intermediate or high risk surgery with history of CAD   · Intermediate or high risk surgery with multiple clinical predictors of CAD- 2 of the following: history of compensated or prior heart failure, history of cerebrovascular disease, DM, or renal insufficiency    Routine administration of higher-dose, long-acting metoprolol in beta-blockernaïve patients on the day of surgery, and in the absence of dose titration is associated with an overall increase in mortality. Beta-blockers should be started days to weeks prior to surgery and titrated to pulse < 70.  4. Deep vein thrombosis prophylaxis: regimen to be chosen by surgical team  5. No contraindications to planned surgery. Patient will be re-evaluated by podiatry tomorrow to insure that the infection has cleared.

## 2021-10-26 NOTE — PROGRESS NOTES
Occupational Therapy Lymphedema Prehab Screen    Date: 10/26/2021  Patient Name: Geovani Sweeney        MRN: [id number]  Account: [de-identified]   : 1940  (68 y.o.)    Chart Review:  Diagnosis: There were no encounter diagnoses. Past Medical History:   Diagnosis Date    Chronic back pain     Urinary incontinence      Past Surgical History:   Procedure Laterality Date    SPINE SURGERY         Strength:   WNL BUEs    ROM:    WNL BUEs    Observation:   Good skin integrity    Circumferential Measurements    Location Rt UE (cm)   Date: 10/26/21 Lt UE (cm)   Date:  10/26/21   3rd DIP/ PIP 5.7/6.5 5.4/6.3   10 cm from distal 3rd finger 24 22   15 cm 21 20   20 cm 17 17   30 cm 25 24   40 cm 28.5 28   50 cm 31.5 30.5   60 cm 35.5 34.5                Brief Fatigue Inventory   Throughout our lives, most of us have times when we feel very tired or fatigued. Have you felt unusually tired or fatigued in the last week? No   Scale: 0 (No Fatigue)  <<<<>>>>  10 (As Bad as You Can Imagine)   1. How would you rate your fatigue (weariness, tiredness) right NOW? 0   2. How would you rate your USUAL level of fatigue during the past 24 hours? 0   3. How would you rate your WORST level of fatigue during the past 24 hours? 0   Scale: 0 (Does Not Interfere) <<<<>>>> 10 (Completely Interferes)   4. How would you rate how your fatigue has interfered with your GENERAL ACTIVITY in the past 24 hours? 0   5. How would you rate how your fatigue has interfered with your MOOD in the past 24 hours? 0   6. How would you rate how your fatigue has interfered with your WALKING ABILITY in the past 24 hours? 0   7. How would you rate how your fatigue has interfered with your NORMAL WORK (both outside and inside the home) in the past 24 hours? 0   8. How would you rate how your fatigue has interfered with your RELATIONS WITH OTHER PEOPLE in the past 24 hours? 0   9.  How would you rate how your fatigue has interfered with your ENJOYMENT OF LIFE in the past 24 hours? 0   Total Score (sum of points for all 9 items/9): 0   Levels of Fatigue:  0 = None  1 -3 = Mild  4  6 = Moderate  7  10 = Severe None   *Adapted from  [facility] pt. in lymphedema signs and symptoms and provided written hand out. Yes    Instructed pt. in ROM HEP for prevention and reduction of lymphedema symptoms. Yes    Pt. demo good understanding of information provided. YES__X___  NO_____ Comments:    Plan:  Follow up with physician per physician orders. Therapy Time:   OT Individual Minutes  Time In: 1310  Time Out: 4873  Minutes: 45    Electronically signed by:     Ashly Puentes OTR/L, OTR/L  10/26/2021, 2:01 PM

## 2021-10-26 NOTE — PROGRESS NOTES
Patient teaching completed for their scheduled Right Breast Lumpectomy and SLNB on 10-. I reviewed topical application of the EMLA cream to  The right breast areola 1.5 hours prior to Nuclear Medicine Injections. I reviewed Surgical instructions with the Patient, including the following information, you will receive a phone call from the surgery schedulers the day prior to the surgery, usually between 3-5 pm, to let you know what time to report to surgery. A map of [facility] was given to the Patient with instructions on where to park and go the day of surgery. The Patient was instructed to Not Eat or drink anything after midnight, before their surgery. Approved medications, which you have discussed with your Doctor, can be taken the morning of surgery, with sips of water. All blood thinners, oral anticoagulants, Aspirin, NSAIDS, should be stopped at least ten days prior to surgery. Please follow your Doctors instructions. Do not chew gum, take cough drops or eat hard candy the morning of your surgery. Do not wear any jewelry. Remove all body piercings prior to arriving for surgery. Leave all valuable items at home. Avoid hairspray, make-up, deodorant, lotions, nail polish or acrylic nails. Bring all personal care items, toiletries, and a loose fitting shirt that buttons or zips in the front to wear when discharged. Dont forget to bring your cell phone . Bring your cases for dentures and glasses, do not wear contact lenses. You will need an adult to drive you home after your surgery or when you are discharged from the hospital.No driving for one week after Lumpectomy. The Surgeon will call you with your pathology results in about 7-10 days. Do not lift greater than 10 pounds or use the affected arm to reach upwards after surgery, until the Doctor clears you post operatively. You may be asked to wear a surgical bra.  If it is recommended that you wear one,it will be given to you after your surgery. Wear the surgical bra  or ace wrap for the first 48 hours after surgery. After surgery, you will need to take care of your incision as it heals. Either stitches, staples, or tape strips ( steri-strips),or surgical glue were used to close your incision. You will need to keep the area clean, change the dressing according to the wound care instructions that were given to you and observe for s/s of infection at each dressing change. You may notice soreness, tenderness, tingling, numbness and itching around your incision. There may also be mild oozing and bruising, and a small lump may form. This is normal and no cause for concern. Placing an ice bag on the site,it may help with any swelling or discomfort. After 48 hours you may, remove the outer gauze bandage. You can take a shower. Allow the soap and water to just run over the incision, do not scrub the incision, gently pat the area dry. If steri strips or surgical glue are covering the incision,do not remove, the surgeon will address the steri strips or glue at your post operative appointment. Redress the wound with gauze/Band-Aid as needed. Take Tylenol or Motrin for pain. If you notice any of the following signs of an infection, such as, a yellow or green discharge that is increasing, a change in the odor of the discharge/drainage, a change in the size of the incision,redness or hardening of the area surrounding the incision or if it is  hot to the touch, a fever, or excessive bleeding that has soaked through the dressing, notify the surgeon as soon as possible. I educated the Patient on SEFERINO drains . I explained that they may have an external drainage device after surgery. I reviewed how to empty and record the drainage output on the log sheet they were given. The Patient is aware to call Dr. Brook Palmer, if they develop a fever over 101?  F, increased drainage (more than 240 cc 8 ounces per drain over one 24-hour period), or increased pain not controlled by the pain operative exercises. The Patient is aware to not begin exercising until the surgeon has approved exercising post operatively. Your postop appointment is on 11/10/2021 at the [place] office, address provided. The Patient verbalized understanding of all of the surgical instructions and has no further questions at this time.

## 2021-10-26 NOTE — PROGRESS NOTES
SW introduced self to Pt (and Dtr) prior to visit today w/ LACEY Muniz (teaching) and Prehab  juan jose't. Pt states has very good support and states is coping quite well with breast cancer diagnosis and treatment planning juan jose'ts. States no distress regarding upcoming surgery. SW provided business card contact; acknowledging Social Workers are a continuing support throughout the breast cancer dx and treatment process. Pt states no current needs, issues or concerns.

## 2021-10-27 ENCOUNTER — ANESTHESIA EVENT (OUTPATIENT)
Dept: OPERATING ROOM | Age: 81
End: 2021-10-27
Payer: COMMERCIAL

## 2021-10-27 NOTE — ANESTHESIA PRE PROCEDURE
Department of Anesthesiology  Preprocedure Note       Name:  Min Lawrence   Age:  68 y.o.  :  1940                                          MRN:  [id number]         Date:  10/27/2021      Surgeon: Autumn Auguste):  Jose Hahn MD    Procedure: Procedure(s):  RIGHT BREAST LUMPECTOMY AND SENTINEL LYMPH NODE BIOPSY NM: 7:30 AM (PAT [address])    Medications prior to admission:   Prior to Admission medications    Medication Sig Start Date End Date Taking?  Authorizing Provider   lidocaine-prilocaine (EMLA) 2.5-2.5 % cream Apply topically to Right Breast areola 1.5 hours prior to Nuclear Medicine Injections 10/26/21   Jose Hahn MD   Cephalexin 500 MG TABS Take 1 tablet by mouth 2 times daily 10/15/21   Historical Provider, MD   ezetimibe (ZETIA) 10 MG tablet Take 10 mg by mouth daily 21   Historical Provider, MD   calcium citrate-vitamin D (CITRACAL+D) 315-200 MG-UNIT per tablet Take 1 tablet by mouth 13   Historical Provider, MD   candesartan-hydrochlorothiazide (ATACAND HCT) 32-12.5 MG per tablet  19   Historical Provider, MD   tretinoin (RETIN-A) 0.1 % cream Apply sparingly at bedtime 16   Historical Provider, MD       Current medications:    Current Facility-Administered Medications   Medication Dose Route Frequency Provider Last Rate Last Admin    lidocaine 1 % injection 10 mL  10 mL IntraDERmal Once Jose Hahn MD        sodium bicarbonate 8.4 % injection 3 mEq  3 mL IntraVENous Once Jose Hahn MD         Current Outpatient Medications   Medication Sig Dispense Refill    lidocaine-prilocaine (EMLA) 2.5-2.5 % cream Apply topically to Right Breast areola 1.5 hours prior to Nuclear Medicine Injections 1 each 0    Cephalexin 500 MG TABS Take 1 tablet by mouth 2 times daily      ezetimibe (ZETIA) 10 MG tablet Take 10 mg by mouth daily      calcium citrate-vitamin D (CITRACAL+D) 315-200 MG-UNIT per tablet Take 1 tablet by mouth      candesartan-hydrochlorothiazide (ATACAND HCT) 32-12.5 MG per tablet       tretinoin (RETIN-A) 0.1 % cream Apply sparingly at bedtime         Allergies: Allergies   Allergen Reactions    Atorvastatin      Muscle pain    Clonidine      SKIN RX.  Fenofibrate Micronized      Muscle pain    Levofloxacin      Other reaction(s): Other: See Comments  ? tendonitis    Raloxifene      EVISTA - HOT FLASHES, MOOD SWINGS   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 C50.411, Z17.1       Past Medical History:        Diagnosis Date    Chronic back pain     Urinary incontinence        Past Surgical History:        Procedure Laterality Date    SPINE SURGERY         Social History:    Social History     Tobacco Use    Smoking status: Former Smoker     Quit date:      Years since quittin.8    Smokeless tobacco: Never Used   Substance Use Topics    Alcohol use: No                                Counseling given: Not Answered      Vital Signs (Current): There were no vitals filed for this visit.                                            BP Readings from Last 3 Encounters:   10/26/21 (!) 169/87   10/26/21 (!) 140/80   10/19/21 (!) 150/82       NPO Status:                                                                                 BMI:   Wt Readings from Last 3 Encounters:   10/26/21 184 lb (83.5 kg)   10/26/21 185 lb (83.9 kg)   10/19/21 188 lb 12.8 oz (85.6 kg)     There is no height or weight on file to calculate BMI.    CBC:   Lab Results   Component Value Date    WBC 5.8 10/26/2021    RBC 4.62 10/26/2021    HGB 13.8 10/26/2021    HCT 41.2 10/26/2021    MCV 89.3 10/26/2021    RDW 13.2 10/26/2021     10/26/2021       CMP:   Lab Results   Component Value Date     10/26/2021    K 4.7 10/26/2021     10/26/2021    CO2 26 10/26/2021    BUN 20 10/26/2021    CREATININE 0.91 10/26/2021    GFRAA >60.0 10/26/2021    LABGLOM 59.7 10/26/2021    GLUCOSE 113 10/26/2021    CALCIUM 9.5 10/26/2021       POC Tests: No results for input(s): POCGLU, POCNA, POCK, POCCL, POCBUN, POCHEMO, POCHCT in the last 72 hours. Coags: No results found for: PROTIME, INR, APTT    HCG (If Applicable): No results found for: PREGTESTUR, PREGSERUM, HCG, HCGQUANT     ABGs: No results found for: PHART, PO2ART, SYF0RIV, HLM4JXG, BEART, X4LHAJCZ     Type & Screen (If Applicable):  No results found for: LABABO, LABRH    Drug/Infectious Status (If Applicable):  No results found for: HIV, HEPCAB    COVID-19 Screening (If Applicable): No results found for: COVID19        Anesthesia Evaluation  Patient summary reviewed and Nursing notes reviewed no history of anesthetic complications:   Airway: Mallampati: II  TM distance: >3 FB   Neck ROM: full  Mouth opening: > = 3 FB Dental: normal exam         Pulmonary:Negative Pulmonary ROS and normal exam                               Cardiovascular:Negative CV ROS  Exercise tolerance: good (>4 METS),         ECG reviewed               Beta Blocker:  Not on Beta Blocker         Neuro/Psych:   Negative Neuro/Psych ROS              GI/Hepatic/Renal: Neg GI/Hepatic/Renal ROS            Endo/Other: Negative Endo/Other ROS   (+) : arthritis: OA., . Pt had PAT visit. Abdominal:             Vascular: negative vascular ROS. Other Findings:             Anesthesia Plan      general and regional     ASA 2     (LMA   PECS block)  Induction: intravenous. MIPS: Postoperative opioids intended and Prophylactic antiemetics administered.   Anesthetic plan and risks discussed with patient. Plan discussed with CRNA.     Attending anesthesiologist reviewed and agrees with Pre Eval content              Cristopher Garcia MD   10/27/2021

## 2021-10-28 ENCOUNTER — HOSPITAL ENCOUNTER (OUTPATIENT)
Dept: NUCLEAR MEDICINE | Age: 81
Discharge: HOME | End: 2021-10-30
Payer: COMMERCIAL

## 2021-10-28 ENCOUNTER — ANESTHESIA (OUTPATIENT)
Dept: OPERATING ROOM | Age: 81
End: 2021-10-28
Payer: COMMERCIAL

## 2021-10-28 ENCOUNTER — HOSPITAL ENCOUNTER (OUTPATIENT)
Age: 81
Setting detail: OUTPATIENT SURGERY
Discharge: HOME | End: 2021-10-28
Attending: SURGERY | Admitting: SURGERY
Payer: COMMERCIAL

## 2021-10-28 VITALS
DIASTOLIC BLOOD PRESSURE: 70 MMHG | SYSTOLIC BLOOD PRESSURE: 156 MMHG | HEIGHT: 64 IN | HEART RATE: 69 BPM | RESPIRATION RATE: 16 BRPM | WEIGHT: 185 LBS | OXYGEN SATURATION: 98 % | BODY MASS INDEX: 31.58 KG/M2 | TEMPERATURE: 97.2 F

## 2021-10-28 VITALS — TEMPERATURE: 93.7 F | OXYGEN SATURATION: 99 % | SYSTOLIC BLOOD PRESSURE: 97 MMHG | DIASTOLIC BLOOD PRESSURE: 53 MMHG

## 2021-10-28 DIAGNOSIS — C50.411 CARCINOMA OF UPPER-OUTER QUADRANT OF RIGHT BREAST IN FEMALE, ESTROGEN RECEPTOR NEGATIVE (HCC): ICD-10-CM

## 2021-10-28 DIAGNOSIS — Z17.1 CARCINOMA OF UPPER-OUTER QUADRANT OF RIGHT BREAST IN FEMALE, ESTROGEN RECEPTOR NEGATIVE (HCC): ICD-10-CM

## 2021-10-28 PROCEDURE — 3700000000 HC ANESTHESIA ATTENDED CARE: Performed by: SURGERY

## 2021-10-28 PROCEDURE — 14000 TIS TRNFR TRUNK 10 SQ CM/<: CPT | Performed by: SURGERY

## 2021-10-28 PROCEDURE — 19301 PARTIAL MASTECTOMY: CPT | Performed by: SURGERY

## 2021-10-28 PROCEDURE — 2580000003 HC RX 258: Performed by: SURGERY

## 2021-10-28 PROCEDURE — 88341 IMHCHEM/IMCYTCHM EA ADD ANTB: CPT

## 2021-10-28 PROCEDURE — 6360000002 HC RX W HCPCS: Performed by: NURSE ANESTHETIST, CERTIFIED REGISTERED

## 2021-10-28 PROCEDURE — 2580000003 HC RX 258: Performed by: ANESTHESIOLOGY

## 2021-10-28 PROCEDURE — 88307 TISSUE EXAM BY PATHOLOGIST: CPT

## 2021-10-28 PROCEDURE — 7100000011 HC PHASE II RECOVERY - ADDTL 15 MIN: Performed by: SURGERY

## 2021-10-28 PROCEDURE — 3430000000 HC RX DIAGNOSTIC RADIOPHARMACEUTICAL: Performed by: SURGERY

## 2021-10-28 PROCEDURE — 76998 US GUIDE INTRAOP: CPT | Performed by: SURGERY

## 2021-10-28 PROCEDURE — 64450 NJX AA&/STRD OTHER PN/BRANCH: CPT | Performed by: ANESTHESIOLOGY

## 2021-10-28 PROCEDURE — 7100000000 HC PACU RECOVERY - FIRST 15 MIN: Performed by: SURGERY

## 2021-10-28 PROCEDURE — A9541 TC99M SULFUR COLLOID: HCPCS | Performed by: SURGERY

## 2021-10-28 PROCEDURE — 6360000002 HC RX W HCPCS: Performed by: SURGERY

## 2021-10-28 PROCEDURE — 6360000002 HC RX W HCPCS: Performed by: ANESTHESIOLOGY

## 2021-10-28 PROCEDURE — 88305 TISSUE EXAM BY PATHOLOGIST: CPT

## 2021-10-28 PROCEDURE — 3600000014 HC SURGERY LEVEL 4 ADDTL 15MIN: Performed by: SURGERY

## 2021-10-28 PROCEDURE — 6370000000 HC RX 637 (ALT 250 FOR IP): Performed by: ANESTHESIOLOGY

## 2021-10-28 PROCEDURE — 88342 IMHCHEM/IMCYTCHM 1ST ANTB: CPT

## 2021-10-28 PROCEDURE — 3600000004 HC SURGERY LEVEL 4 BASE: Performed by: SURGERY

## 2021-10-28 PROCEDURE — 3700000001 HC ADD 15 MINUTES (ANESTHESIA): Performed by: SURGERY

## 2021-10-28 PROCEDURE — 38525 BIOPSY/REMOVAL LYMPH NODES: CPT | Performed by: SURGERY

## 2021-10-28 PROCEDURE — 7100000010 HC PHASE II RECOVERY - FIRST 15 MIN: Performed by: SURGERY

## 2021-10-28 PROCEDURE — 2720000010 HC SURG SUPPLY STERILE: Performed by: SURGERY

## 2021-10-28 PROCEDURE — 2709999900 HC NON-CHARGEABLE SUPPLY: Performed by: SURGERY

## 2021-10-28 PROCEDURE — 7100000001 HC PACU RECOVERY - ADDTL 15 MIN: Performed by: SURGERY

## 2021-10-28 PROCEDURE — 38792 RA TRACER ID OF SENTINL NODE: CPT

## 2021-10-28 PROCEDURE — 38900 IO MAP OF SENT LYMPH NODE: CPT | Performed by: SURGERY

## 2021-10-28 RX ORDER — FENTANYL CITRATE 50 UG/ML
25 INJECTION, SOLUTION INTRAMUSCULAR; INTRAVENOUS EVERY 5 MIN PRN
Status: DISCONTINUED | OUTPATIENT
Start: 2021-10-28 | End: 2021-10-28 | Stop reason: HOSPADM

## 2021-10-28 RX ORDER — SODIUM CHLORIDE 9 MG/ML
INJECTION INTRAVENOUS PRN
Status: DISCONTINUED | OUTPATIENT
Start: 2021-10-28 | End: 2021-10-28 | Stop reason: ALTCHOICE

## 2021-10-28 RX ORDER — ONDANSETRON 2 MG/ML
INJECTION INTRAMUSCULAR; INTRAVENOUS PRN
Status: DISCONTINUED | OUTPATIENT
Start: 2021-10-28 | End: 2021-10-28 | Stop reason: SDUPTHER

## 2021-10-28 RX ORDER — 0.9 % SODIUM CHLORIDE 0.9 %
500 INTRAVENOUS SOLUTION INTRAVENOUS
Status: DISCONTINUED | OUTPATIENT
Start: 2021-10-28 | End: 2021-10-28 | Stop reason: HOSPADM

## 2021-10-28 RX ORDER — ROPIVACAINE HYDROCHLORIDE 5 MG/ML
INJECTION, SOLUTION EPIDURAL; INFILTRATION; PERINEURAL
Status: COMPLETED | OUTPATIENT
Start: 2021-10-28 | End: 2021-10-28

## 2021-10-28 RX ORDER — METHYLENE BLUE 10 MG/ML
INJECTION INTRAVENOUS PRN
Status: DISCONTINUED | OUTPATIENT
Start: 2021-10-28 | End: 2021-10-28 | Stop reason: ALTCHOICE

## 2021-10-28 RX ORDER — PROPOFOL 10 MG/ML
INJECTION, EMULSION INTRAVENOUS PRN
Status: DISCONTINUED | OUTPATIENT
Start: 2021-10-28 | End: 2021-10-28 | Stop reason: SDUPTHER

## 2021-10-28 RX ORDER — HYDROCODONE BITARTRATE AND ACETAMINOPHEN 5; 325 MG/1; MG/1
1 TABLET ORAL PRN
Status: COMPLETED | OUTPATIENT
Start: 2021-10-28 | End: 2021-10-28

## 2021-10-28 RX ORDER — ONDANSETRON 2 MG/ML
4 INJECTION INTRAMUSCULAR; INTRAVENOUS
Status: DISCONTINUED | OUTPATIENT
Start: 2021-10-28 | End: 2021-10-28 | Stop reason: HOSPADM

## 2021-10-28 RX ORDER — METOCLOPRAMIDE HYDROCHLORIDE 5 MG/ML
10 INJECTION INTRAMUSCULAR; INTRAVENOUS
Status: DISCONTINUED | OUTPATIENT
Start: 2021-10-28 | End: 2021-10-28 | Stop reason: HOSPADM

## 2021-10-28 RX ORDER — LABETALOL HYDROCHLORIDE 5 MG/ML
5 INJECTION, SOLUTION INTRAVENOUS EVERY 10 MIN PRN
Status: DISCONTINUED | OUTPATIENT
Start: 2021-10-28 | End: 2021-10-28 | Stop reason: HOSPADM

## 2021-10-28 RX ORDER — MIDAZOLAM HYDROCHLORIDE 1 MG/ML
INJECTION INTRAMUSCULAR; INTRAVENOUS PRN
Status: DISCONTINUED | OUTPATIENT
Start: 2021-10-28 | End: 2021-10-28 | Stop reason: SDUPTHER

## 2021-10-28 RX ORDER — HYDROCODONE BITARTRATE AND ACETAMINOPHEN 5; 325 MG/1; MG/1
2 TABLET ORAL PRN
Status: COMPLETED | OUTPATIENT
Start: 2021-10-28 | End: 2021-10-28

## 2021-10-28 RX ORDER — DEXAMETHASONE SODIUM PHOSPHATE 10 MG/ML
INJECTION INTRAMUSCULAR; INTRAVENOUS PRN
Status: DISCONTINUED | OUTPATIENT
Start: 2021-10-28 | End: 2021-10-28 | Stop reason: SDUPTHER

## 2021-10-28 RX ORDER — MAGNESIUM HYDROXIDE 1200 MG/15ML
LIQUID ORAL PRN
Status: DISCONTINUED | OUTPATIENT
Start: 2021-10-28 | End: 2021-10-28 | Stop reason: ALTCHOICE

## 2021-10-28 RX ORDER — LIDOCAINE HYDROCHLORIDE 20 MG/ML
INJECTION, SOLUTION INTRAVENOUS PRN
Status: DISCONTINUED | OUTPATIENT
Start: 2021-10-28 | End: 2021-10-28 | Stop reason: SDUPTHER

## 2021-10-28 RX ORDER — SODIUM CHLORIDE, SODIUM LACTATE, POTASSIUM CHLORIDE, CALCIUM CHLORIDE 600; 310; 30; 20 MG/100ML; MG/100ML; MG/100ML; MG/100ML
INJECTION, SOLUTION INTRAVENOUS CONTINUOUS
Status: DISCONTINUED | OUTPATIENT
Start: 2021-10-28 | End: 2021-10-28 | Stop reason: HOSPADM

## 2021-10-28 RX ORDER — FENTANYL CITRATE 50 UG/ML
INJECTION, SOLUTION INTRAMUSCULAR; INTRAVENOUS PRN
Status: DISCONTINUED | OUTPATIENT
Start: 2021-10-28 | End: 2021-10-28 | Stop reason: SDUPTHER

## 2021-10-28 RX ORDER — DIPHENHYDRAMINE HYDROCHLORIDE 50 MG/ML
12.5 INJECTION INTRAMUSCULAR; INTRAVENOUS
Status: DISCONTINUED | OUTPATIENT
Start: 2021-10-28 | End: 2021-10-28 | Stop reason: HOSPADM

## 2021-10-28 RX ADMIN — DEXAMETHASONE SODIUM PHOSPHATE 6 MG: 10 INJECTION INTRAMUSCULAR; INTRAVENOUS at 08:56

## 2021-10-28 RX ADMIN — FENTANYL CITRATE 25 MCG: 50 INJECTION, SOLUTION INTRAMUSCULAR; INTRAVENOUS at 09:43

## 2021-10-28 RX ADMIN — Medication 2 MILLICURIE: at 07:43

## 2021-10-28 RX ADMIN — FENTANYL CITRATE 50 MCG: 50 INJECTION, SOLUTION INTRAMUSCULAR; INTRAVENOUS at 08:40

## 2021-10-28 RX ADMIN — HYDROCODONE BITARTRATE AND ACETAMINOPHEN 1 TABLET: 5; 325 TABLET ORAL at 11:24

## 2021-10-28 RX ADMIN — FENTANYL CITRATE 25 MCG: 50 INJECTION, SOLUTION INTRAMUSCULAR; INTRAVENOUS at 10:33

## 2021-10-28 RX ADMIN — ROPIVACAINE HYDROCHLORIDE 30 ML: 5 INJECTION, SOLUTION EPIDURAL; INFILTRATION; PERINEURAL at 08:26

## 2021-10-28 RX ADMIN — SODIUM CHLORIDE, POTASSIUM CHLORIDE, SODIUM LACTATE AND CALCIUM CHLORIDE: 600; 310; 30; 20 INJECTION, SOLUTION INTRAVENOUS at 08:19

## 2021-10-28 RX ADMIN — FENTANYL CITRATE 25 MCG: 50 INJECTION, SOLUTION INTRAMUSCULAR; INTRAVENOUS at 10:42

## 2021-10-28 RX ADMIN — LIDOCAINE HYDROCHLORIDE 60 MG: 20 INJECTION, SOLUTION INTRAVENOUS at 08:34

## 2021-10-28 RX ADMIN — MIDAZOLAM HYDROCHLORIDE 2 MG: 2 INJECTION, SOLUTION INTRAMUSCULAR; INTRAVENOUS at 08:19

## 2021-10-28 RX ADMIN — FENTANYL CITRATE 25 MCG: 50 INJECTION, SOLUTION INTRAMUSCULAR; INTRAVENOUS at 10:53

## 2021-10-28 RX ADMIN — FENTANYL CITRATE 25 MCG: 50 INJECTION, SOLUTION INTRAMUSCULAR; INTRAVENOUS at 09:00

## 2021-10-28 RX ADMIN — PROPOFOL 150 MG: 10 INJECTION, EMULSION INTRAVENOUS at 08:34

## 2021-10-28 RX ADMIN — CEFAZOLIN 2 G: 10 INJECTION, POWDER, FOR SOLUTION INTRAVENOUS at 08:29

## 2021-10-28 RX ADMIN — ONDANSETRON 4 MG: 2 INJECTION INTRAMUSCULAR; INTRAVENOUS at 09:42

## 2021-10-28 ASSESSMENT — PULMONARY FUNCTION TESTS
PIF_VALUE: 10
PIF_VALUE: 2
PIF_VALUE: 12
PIF_VALUE: 10
PIF_VALUE: 0
PIF_VALUE: 10
PIF_VALUE: 12
PIF_VALUE: 10
PIF_VALUE: 12
PIF_VALUE: 1
PIF_VALUE: 2
PIF_VALUE: 10
PIF_VALUE: 12
PIF_VALUE: 9
PIF_VALUE: 10
PIF_VALUE: 0
PIF_VALUE: 10
PIF_VALUE: 12
PIF_VALUE: 3
PIF_VALUE: 12
PIF_VALUE: 10
PIF_VALUE: 12
PIF_VALUE: 0
PIF_VALUE: 10
PIF_VALUE: 2
PIF_VALUE: 10
PIF_VALUE: 12
PIF_VALUE: 10
PIF_VALUE: 10
PIF_VALUE: 14
PIF_VALUE: 10
PIF_VALUE: 12
PIF_VALUE: 10
PIF_VALUE: 12
PIF_VALUE: 10
PIF_VALUE: 12
PIF_VALUE: 10
PIF_VALUE: 10
PIF_VALUE: 0
PIF_VALUE: 12
PIF_VALUE: 10
PIF_VALUE: 27
PIF_VALUE: 10
PIF_VALUE: 12
PIF_VALUE: 10
PIF_VALUE: 12
PIF_VALUE: 0
PIF_VALUE: 12
PIF_VALUE: 10

## 2021-10-28 ASSESSMENT — PAIN SCALES - GENERAL
PAINLEVEL_OUTOF10: 7
PAINLEVEL_OUTOF10: 6
PAINLEVEL_OUTOF10: 10
PAINLEVEL_OUTOF10: 10

## 2021-10-28 NOTE — ANESTHESIA PROCEDURE NOTES
Peripheral Block    Patient location during procedure: pre-op  Start time: 10/28/2021 8:16 AM  End time: 10/28/2021 8:26 AM  Staffing  Performed: anesthesiologist   Anesthesiologist: Maryellen Oleary MD  Preanesthetic Checklist  Completed: patient identified, IV checked, site marked, risks and benefits discussed, surgical consent, monitors and equipment checked, pre-op evaluation, timeout performed, anesthesia consent given, oxygen available and patient being monitored  Peripheral Block  Patient position: supine  Prep: ChloraPrep  Patient monitoring: cardiac monitor, continuous pulse ox, frequent blood pressure checks and IV access  Block type: PECS I and PECS II  Laterality: right  Injection technique: single-shot  Guidance: nerve stimulator and ultrasound guided  Local infiltration: ropivacaine  Infiltration strength: 0.5 %  Dose: 30 mL  Provider prep: mask and sterile gloves (Sterile probe cover)  Local infiltration: ropivacaine  Needle  Needle type: combined needle/nerve stimulator   Needle gauge: 22 G  Needle length: 5 cm  Needle localization: anatomical landmarks and ultrasound guidance  Assessment  Injection assessment: negative aspiration for heme, no paresthesia on injection and local visualized surrounding nerve on ultrasound  Paresthesia pain: immediately resolved  Slow fractionated injection: yes  Hemodynamics: stable  Additional Notes  Ultrasound image printed and saved in patient chart.     Sterile probe cover used    Medications Administered  Ropivacaine (NAROPIN) injection 0.5%, 30 mL  Reason for block: post-op pain management and at surgeon's request

## 2021-10-28 NOTE — ANESTHESIA POSTPROCEDURE EVALUATION
Department of Anesthesiology  Postprocedure Note    Patient: Vivienne Funk  MRN: [id number]  YOB: 1940  Date of evaluation: 10/28/2021  Time:  10:03 AM     Procedure Summary     Date: 10/28/21 Room / Location: [address]    Anesthesia Start: 7232 Anesthesia Stop:     Procedure: RIGHT BREAST LUMPECTOMY AND SENTINEL LYMPH NODE BIOPSY (Right ) Diagnosis: (RIGHT BREAST CANCER)    Surgeons: Blu Olivo MD Responsible Provider: Ling Mcdaniel MD    Anesthesia Type: general, regional ASA Status: 2          Anesthesia Type: No value filed. Kellie Phase I:      Kellie Phase II:      Last vitals: Reviewed and per EMR flowsheets.        Anesthesia Post Evaluation    Patient location during evaluation: PACU  Patient participation: complete - patient participated  Level of consciousness: awake and alert  Airway patency: patent  Nausea & Vomiting: no nausea and no vomiting  Complications: no  Cardiovascular status: blood pressure returned to baseline and hemodynamically stable  Respiratory status: acceptable  Hydration status: euvolemic

## 2021-10-28 NOTE — PROGRESS NOTES
Pt to [facility], [place], operated by [facility] via WC, pt alert and stable

## 2021-10-28 NOTE — H&P
UPDATED HISTORY AND PHYSICAL EXAMINATION    SERVICE DATE:  10/28/2021   SERVICE TIME:  8:31 AM    PHYSICAL EXAM MUST BE COMPLETED ON ADMISSION    The History and Physical (completed in the past 30 days) has been reviewed and the patient has been examined. The contents accurately reflect the patient's condition with the following additions or revisions since the H&P was completed. Examination indicates no changes. This H&P can be found in the Epic. The patient was counseled at length about the risks of lidia Covid-19 during their perioperative period and any recovery window from their procedure. The patient was made aware that lidia Covid-19  may worsen their prognosis for recovering from their procedure  and lend to a higher morbidity and/or mortality risk. All material risks, benefits, and reasonable alternatives including postponing the procedure were discussed. The patient does wish to proceed with the procedure at this time.     SIGNATURE: Jason Hoffman MD PATIENT NAME: Carolina Peters   DATE: 10/28/21 MRN: [id number]   TIME: 8:31 AM EDT PHONE: [phone]

## 2021-10-28 NOTE — OP NOTE
OPERATIVE REPORT    LOG ID: [id number]  Surgery Date: 10/28/2021  Incision/Procedure Start Time: 2967  Incision Close/Procedure End Time: 0930    Procedure Performed: Procedure(s):  RIGHT BREAST LUMPECTOMY AND SENTINEL LYMPH NODE BIOPSY     Surgeon(s)/Proceduralist(s) and Assistant(s):  Surgeon(s) and Role:     * Chepe Arteaga MD - Primary  First Assistant: Mari Martinez  Scrub Person First: Marianne Schroeder       Anesthesia: General   Anesthesiologist: Giovani Markham MD  CRNA: GIANNA Maza - CRNA       Pre-Operative Diagnosis:  RIGHT BREAST CANCER   Post-Operative Diagnosis:  SAME    ESTIMATED BLOOD LOSS: Minimal, 5 cc. COMPLICATIONS: No complications. FINDINGS: intraoperative US showed grossly negative margins     DRAINS: No drains were placed. PREOPERATIVE ANTIBIOTICS: ANCEF 2 GRAMS     SPECIMEN: right sln x2; right breast mass, Superior, medial, inferior, lateral, posterior margins were taken. She received Venodynes bilaterally. Cancer Staging  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, estrogen receptor negative ([address]  Staging form: Breast, AJCC 8th Edition  - Clinical stage from 10/12/2021: Stage IB (cT1, cN0, cM0, G3, ER-, PA-, HER2-) - Signed by Chepe Arteaga MD on 10/25/2021       INDICATIONS:  Millicent Briones is a 68 y.o.  female  who presented with a right breast imaging abnormality. She underwent a minimally invasive biopsy that showed cancer. I recommended a right lumpectomy and sentinel lymph node biopsy. She understood the risks and benefits of the procedure and consented on the day of surgery. PROCEDURE: The patient was brought to Radiology for technetium sulfur colloid injection. She was then brought to the operating room and placed in supine position with ipsilateral arm abducted 90 degrees. The right breast, chest and axilla were prepped and draped in usual surgical fashion.  Time out was performed and breast images were reviewed preoperatively. 2 cc of methylene blue mixed with 3 cc of injectable saline was injected under the nipple and 10 minutes of breast massage was given. A curvilinear gretchen was made in the right axilla. A plasmablade was used to cut through skin and subcutaneous tissue. Plasmablade cautery was used for further dissection through the axillary fascia into the deep axillary tissue. 2 hot nodes were identified [place] [phone], background 25. I explained the ACOSOG Z11 trial in detail preoperatively and do think she meets criteria for possible omission of completion axillary node dissection. For this reason we will omit intraoperative frozen sentinel lymph node biopsy section. The wound bed was irrigated, inspected for hemostasis and closed with interrupted 3-0 Vicryl and dermabond. An ultrasound was used for guidance of this mass. An elliptical gretchen was made in the right breast over the mass visualized by ultrasound. A plasmablade was used to cut through skin and subcutaneous tissue. Plasmablade cautery was used for further dissection. A superior, medial, inferior, lateral and posterior flap was created around the mass. It was removed. It was marked red superior, yellow lateral. Superior/medial/inferior/lateral/posterior margins were taken. All were marked red at new margin. She grossly had negative margins. Intraoperative ultrasound revealed clear margins. Staples were placed in the wound bed. An oncoplastic closure was then performed by mobilizing 2 cm of tissue circumferentially and approximated with 3-0 vicryl. The wound bed was irrigated, inspected for hemostasis, closed with interrupted 3-0 Vicryl and dermabond, covered by 4 x 4's, paper tape, and a surgical bra. The patient tolerated the procedure well and was transferred to the recovery room in stable condition. I performed this procedure with a surgical assistant.      HA OPERATIVE STANDARDS     Operation performed with curative intent: Yes   Tracer(s) used to identify sentinel nodes in the upfront surgery (nonneoadjuvant) setting (select all that apply) : Dye and Radioactive tracer   Tracer(s) used to identify sentinel nodes in the neoadjuvant setting (select all that apply): Not Applicable   All nodes (colored or noncolored) present at the end of a dye-filled lymphatic channel were removed: Yes   All significantly radioactive nodes were removed: Yes   All palpably suspicious nodes were removed:  Yes   Biopsy-proven positive nodes marked with clips prior to chemotherapy were identified and removed: Not Applicable    SIGNATURE: Yanci Baker MD PATIENT NAME: Farida Click   DATE: 10/28/21 MRN: [id number]   TIME: 8:32 AM EDT PHONE: [phone]

## 2021-10-29 ENCOUNTER — TELEPHONE (OUTPATIENT)
Dept: SURGERY | Age: 81
End: 2021-10-29

## 2021-10-29 NOTE — TELEPHONE ENCOUNTER
I was calling the patient post Right Breast Lumpectomy and SLNB. LMOM for the patient to return a call to the office with any questions or concerns.

## 2021-11-03 ENCOUNTER — TELEPHONE (OUTPATIENT)
Dept: SURGERY | Age: 81
End: 2021-11-03

## 2021-11-03 NOTE — TELEPHONE ENCOUNTER
I notified the patient of her Right Breast Lumpectomy and SLNB pathology results showing negative margins. The patient verbalized understanding of her pathology results and has no questions at this time. I verified date/time/location of post operative appointment with the patient.

## 2021-11-03 NOTE — TELEPHONE ENCOUNTER
----- Message from Rupali Mauricio MD sent at 11/3/2021  8:09 AM EDT -----  Regarding: call with neg margins    ----- Message -----  From: Idania Hatch Incoming Lab Results From Soft  Sent: 11/2/2021   4:32 PM EDT  To: Rupali Mauricio MD

## 2021-11-10 ENCOUNTER — OFFICE VISIT (OUTPATIENT)
Dept: SURGERY | Age: 81
End: 2021-11-10

## 2021-11-10 VITALS
WEIGHT: 189.4 LBS | HEART RATE: 80 BPM | DIASTOLIC BLOOD PRESSURE: 82 MMHG | SYSTOLIC BLOOD PRESSURE: 136 MMHG | BODY MASS INDEX: 32.33 KG/M2 | HEIGHT: 64 IN | RESPIRATION RATE: 16 BRPM

## 2021-11-10 DIAGNOSIS — Z80.3 FAMILY HISTORY OF BREAST CANCER IN SISTER: ICD-10-CM

## 2021-11-10 DIAGNOSIS — N64.9 BREAST DISORDER: ICD-10-CM

## 2021-11-10 DIAGNOSIS — Z17.1 CARCINOMA OF UPPER-OUTER QUADRANT OF RIGHT BREAST IN FEMALE, ESTROGEN RECEPTOR NEGATIVE (HCC): Primary | ICD-10-CM

## 2021-11-10 DIAGNOSIS — C50.411 CARCINOMA OF UPPER-OUTER QUADRANT OF RIGHT BREAST IN FEMALE, ESTROGEN RECEPTOR NEGATIVE (HCC): Primary | ICD-10-CM

## 2021-11-10 DIAGNOSIS — E66.9 OBESITY, CLASS I, BMI 30-34.9: ICD-10-CM

## 2021-11-10 PROCEDURE — 99024 POSTOP FOLLOW-UP VISIT: CPT | Performed by: SURGERY

## 2021-11-10 NOTE — PATIENT INSTRUCTIONS
Patient Education        Preventing Lymphedema After Treatment for Breast Cancer: Care Instructions  Your Care Instructions     Lymphedema is a buildup of fluid in the soft tissues of the body. It can happen in the arm after breast cancer surgery to remove lymph nodes. If there are few or no lymph nodes, fluid can build up in the arm. It can also happen if the lymph system in an arm has been damaged. Infection, tumors, and scar tissue from radiation therapy to the armpit area also can cause fluid to build up. You may be able to avoid lymphedema or keep it under control by following the tips below. Make sure that you take good care of the skin on your arm and hand. Your skin acts as a barrier to keep out bacteria and prevent infection. It is also important not to overuse the muscles in the arm. And don't expose your arm to very hot or cold temperatures. Lymphedema can happen soon after breast cancer treatment. Or it may happen many years later. It may affect only part of your arm or hand. In some cases, it affects all of the arm. Make sure to follow these precautions even after you finish treatment. Do not ignore tightness or swelling in or around your arm or hand. You are less likely to have long-term problems if you get these symptoms treated right away. Follow-up care is a key part of your treatment and safety. Be sure to make and go to all appointments, and call your doctor if you are having problems. It's also a good idea to know your test results and keep a list of the medicines you take. How can you care for yourself at home? Skin care    · Keep your arm, hand, and armpit clean. Use a mild soap that does not dry out your skin.     · Moisturize your skin often.     · Take good care of the skin around your fingernails.  Do not bite or cut your cuticles.     · Ask your doctor how to handle any cuts, scratches, insect bites, or other injuries you may get.     · Use sunscreen and insect repellent outdoors to protect your skin from sunburn and insect bites.     · Use an electric razor if you shave your armpit. It's less likely to cut or irritate your skin. Activity    · Don't wear clothing or jewelry that is tight on your arm or hand. Your doctor may advise you not to wear a watch or rings on the affected hand.     · Wear gloves when you do activities that could hurt the skin on your fingers or hand. Wear them when you garden, do yard work, wash dishes, and clean with chemicals. Use oven mitts when you handle hot food.     · Do not have blood drawn from the arm on the side of the lymph node surgery. Do not get injections (shots) or have an IV put in the affected arm.     · Do not allow a blood pressure cuff to be placed on that arm. If you are in the hospital, make sure you tell your nurse and other hospital staff about your condition.     · Do not expose your arm to very hot or very cold temperatures. For example, don't use hot tubs, saunas, or steam rooms. Don't use a heating pad or cold pack on that arm or shoulder.     · Rest your arm often when you do repeated movements, such as vacuum, scrub, or mop.     · Try to use your other arm to carry heavy things, such as grocery bags. If you carry a briefcase or a purse, avoid shoulder straps and carry it on your good arm.     · Ask your doctor about wearing a compression sleeve and glove (gauntlet). Your doctor may want you to wear these when you exercise or when you fly in an airplane. They can help keep fluid from pooling in your arm and hand. Exercise    · Ask your doctor about exercises for your arm and hand. Your doctor may recommend that you see a physical therapist. This person can teach you how to do self-massage to move fluid out of your arm.     · Check with your doctor before you start exercises that use the arm. This includes tennis, rowing, or weight lifting. Your doctor can help you find an activity level that's right for you.    When should you call for help?   Call your doctor now or seek immediate medical care if:    · You have signs of infection, such as:  ? Increased pain, swelling, warmth, or redness. ? Red streaks leading from the area. ? Pus draining from the area. ? A fever. Watch closely for changes in your health, and be sure to contact your doctor if:    · You have new or worse symptoms from lymphedema.     · You do not get better as expected. Where can you learn more? Go to https://[employer]ouch. org and sign in to your [employer] account. Enter G546 in the avocarrot box to learn more about \"Preventing Lymphedema After Treatment for Breast Cancer: Care Instructions. \"     If you do not have an account, please click on the \"Sign Up Now\" link. Current as of: December 17, 2020               Content Version: 13.0  © 0966-1620 Healthwise, Incorporated. Care instructions adapted under license by [facility] [address] If you have questions about a medical condition or this instruction, always ask your healthcare professional. [place] [address] any warranty or liability for your use of this information.

## 2021-11-10 NOTE — PROGRESS NOTES
POSTOPERATIVE NOTE    PATIENT:  Ronit Maldonado     DATE:     11/10/2021     TIME: 9:23 AM EST     HISTORY AND CHIEF COMPLAINT:    Ronit Maldonado  is a 68y.o. year old  Female  status post  Right lumpectomy and sln biopsy    The pathology showed clear margins and sln neg. PHYSICAL EXAMINATION:      Vitals:    11/10/21 0901   BP: 136/82   Pulse: 80   Resp: 16       Ronit Maldonado  is a 68y.o. year old  Female in no acute distress, alert and oriented x 3. Incision: clean, dry, intact    IMPRESSION:    Status post right lumpectomy and sln    PLAN:    Patient is doing well. Recommend clinical breast exams in the breast surgical suite in 6 month(s)  Mammography in 6 month(s)  Recommend an active lifestyle, healthy diet, limited alcohol intake, achieve and maintain a healthy BMI to optimize breast cancer outcomes / decrease risk of breast cancer.   Referral to or Follow up with Medical Oncology  Referral to or Follow up with Radiation Oncology  Follow up with PCP    Dictated by:  Chris May MD 11/10/2021 9:23 AM EST

## 2021-11-18 ENCOUNTER — TELEPHONE (OUTPATIENT)
Dept: SURGERY | Age: 81
End: 2021-11-18

## 2021-11-18 NOTE — TELEPHONE ENCOUNTER
----- Message from Sherrell Garcia MD sent at 11/18/2021  8:07 AM EST -----  Regarding: call patient - FISH is positive  ; CAN YOU LET MED ONC KNOW    ----- Message -----  From: Simin Judd Incoming Lab Results From Soft  Sent: 10/14/2021   2:13 PM EST  To: Sherrell Garcia MD

## 2021-11-18 NOTE — TELEPHONE ENCOUNTER
The patient was informed of the positive HER-2 results by FISH. The patient verbalized understanding of her pathology results and has no questions at this time. The patient has an appointment with Dr. Stephen Song [facility] on 12/1/2021.

## 2021-11-22 ENCOUNTER — HOSPITAL ENCOUNTER (OUTPATIENT)
Dept: RADIATION ONCOLOGY | Age: 81
Discharge: HOME | End: 2021-11-22
Payer: COMMERCIAL

## 2021-11-22 VITALS
OXYGEN SATURATION: 97 % | DIASTOLIC BLOOD PRESSURE: 86 MMHG | BODY MASS INDEX: 31.86 KG/M2 | HEIGHT: 64 IN | HEART RATE: 93 BPM | TEMPERATURE: 97.1 F | WEIGHT: 186.6 LBS | SYSTOLIC BLOOD PRESSURE: 139 MMHG | RESPIRATION RATE: 18 BRPM

## 2021-11-22 PROCEDURE — 99205 OFFICE O/P NEW HI 60 MIN: CPT | Performed by: RADIOLOGY

## 2021-11-22 PROCEDURE — 99212 OFFICE O/P EST SF 10 MIN: CPT | Performed by: RADIOLOGY

## 2021-11-22 ASSESSMENT — ENCOUNTER SYMPTOMS
ALLERGIC/IMMUNOLOGIC NEGATIVE: 1
GASTROINTESTINAL NEGATIVE: 1
EYES NEGATIVE: 1

## 2021-11-22 NOTE — CONSULTS
[address] [facility]           Radiation Oncology      [address] [facility]        [address]        Sara Perkinster: [phone]        F: [phone]       [employer]                   Dr. Rey Oilveira MD PhD    CONSULT NOTE     Date of Service: 2021  Patient ID: Fredy Frazier   : 1940  MRN: [id number]   Acct Number: [de-identified]       2021    Fredy Frazier  68 y.o.   1940    REFERRING PROVIDER: Emmy Jaquez    PCP:  Steven Khan MD    DIAGNOSIS: Invasive ductal carcinoma of the right breast    STAGING: Cancer Staging  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, estrogen receptor negative ([facility] [address]  Staging form: Breast, AJCC 8th Edition  - Clinical stage from 10/12/2021: Stage IA (cT1, cN0, cM0, G3, ER-, MN-, HER2+) - Signed by Bonnie Sanz MD on 2021      HISTORY OF PRESENT ILLNESS: Ms. Fredy Frazier  is a 68y.o. 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 Her oncologic history began in October this year when on 10/6/2021 she underwent screening mammogram which revealed an area of scattered fibroglandular densities within each breast.  There was a focal asymmetric mass in the upper outer right breast.  This was read as BI-RADS 0 with additional imaging recommended. On 10/11/2021, right digital diagnostic mammogram and targeted right breast sonogram was performed which revealed a nodular density approximately 8 mm in diameter in the outer right breast that persisted with additional images. The slide within the upper outer quadrant. Targeted right breast ultrasound revealed a 6 x 5 x 4 mm hypoechoic irregular spiculated mass along the 9:00 axis, 6 cm from the nipple that was highly suspicious for tumor.   This was read as BI-RADS 5, and ultrasound-guided biopsy was recommended. On 10/12/2021 ultrasound-guided core biopsy with clip placement was performed which revealed invasive ductal carcinoma measuring 5 mm, grade 3, ER-/NY-/HER-2/beny nonamplified. Ki-67 was 57% positive. Patient underwent a right breast lumpectomy and sentinel lymph node biopsy on 10/28/2021 with pathology revealing invasive ductal carcinoma measuring 8 mm, grade 3, ER-/NY-/HER-2/beny nonamplified by IHC. Final HER-2/beny revealed amplification by FISH. There were 0/2 LN involved with metastatic disease. The patient presents today to discuss the role and rationale of adjuvant radiation therapy in the management of her disease. Symptomatically she has healed well and denies any postoperative complaints. She does have significant anxiety regarding her diagnosis. PAST MEDICAL HISTORY:      Diagnosis Date    Cancer [facility])     Right breast    Cancer ([address]     skin    Chronic back pain     Urinary incontinence        PAST SURGICAL HISTORY:      Procedure Laterality Date    BREAST BIOPSY Right 10/28/2021    RIGHT BREAST LUMPECTOMY AND SENTINEL LYMPH NODE BIOPSY performed by Davie Jett MD at [address] LUMPECTOMY Right 10/28/2021    and SLNB    BUNIONECTOMY Right 09/2021    HAMMER TOE SURGERY Right 09/2021    SPINE SURGERY         Allergies   Allergen Reactions    Atorvastatin      Muscle pain    Clonidine      SKIN RX.  Fenofibrate Micronized      Muscle pain    Levofloxacin      Other reaction(s): Other: See Comments  ? tendonitis    Raloxifene      EVISTA - HOT FLASHES, MOOD SWINGS    Statins Other (See Comments)     muscle pain       MEDICATIONS:  Medications reviewed and reconciled.   Current Outpatient Medications   Medication Sig Dispense Refill    ezetimibe (ZETIA) 10 MG tablet Take 10 mg by mouth daily      calcium citrate-vitamin D (CITRACAL+D) 315-200 MG-UNIT per tablet Take 1 tablet by mouth      candesartan-hydrochlorothiazide (ATACAND HCT) 32-12.5 MG per tablet       tretinoin (RETIN-A) 0.1 % cream Apply sparingly at bedtime       Current Facility-Administered Medications   Medication Dose Route Frequency Provider Last Rate Last Admin    lidocaine 1 % injection 10 mL  10 mL IntraDERmal Once Quirino Montoya MD        sodium bicarbonate 8.4 % injection 3 mEq  3 mL IntraVENous Once Quirino Montoya MD           FAMILY HISTORY:  Family History   Problem Relation Age of Onset    Cancer Father     Diabetes Father     Breast Cancer Sister        SOCIAL HISTORY:       reports that she has quit smoking. She has never used smokeless tobacco..   reports no history of alcohol use. .   reports no history of drug use. REVIEW OF SYSTEMS:  Obtained from the patient, chart review and nursing assessment. Review of Systems   Constitutional: Positive for activity change and appetite change. HENT: Negative. Eyes: Negative. Cardiovascular: Negative. Gastrointestinal: Negative. Endocrine: Negative. Genitourinary: Negative. Musculoskeletal: Negative. Skin: Negative. Allergic/Immunologic: Negative. Neurological: Negative. Hematological: Negative. Psychiatric/Behavioral: The patient is nervous/anxious. PHYSICAL EXAMINATION:      Vitals:    11/22/21 0919   BP: 139/86   Pulse: 93   Resp: 18   Temp: 97.1 °F (36.2 °C)   SpO2: 97%   Weight: 186 lb 9.6 oz (84.6 kg)   Height: 5' 4\" (1.626 m)       Wt Readings from Last 3 Encounters:   11/22/21 186 lb 9.6 oz (84.6 kg)   11/10/21 189 lb 6.4 oz (85.9 kg)   10/28/21 185 lb (83.9 kg)       ECOG PERFORMANCE STATUS:  1    Physical Exam  Vitals and nursing note reviewed. Constitutional:       General: She is not in acute distress. Appearance: Normal appearance. She is not ill-appearing, toxic-appearing or diaphoretic. Comments: Accompanied by a good friend. HENT:      Head: Normocephalic and atraumatic.    Eyes:      General: No scleral icterus. Extraocular Movements: Extraocular movements intact. Cardiovascular:      Rate and Rhythm: Regular rhythm. Heart sounds: Normal heart sounds. Pulmonary:      Breath sounds: Normal breath sounds. Chest:          Comments: Evolving postoperative changes in the right breast with incisions C/D/I. There are otherwise no palpable abnormalities in the left breast or bilateral chest wall. Abdominal:      General: Bowel sounds are normal.      Palpations: Abdomen is soft. Genitourinary:     Comments: Deferred. Musculoskeletal:         General: Normal range of motion. Cervical back: Normal range of motion and neck supple. No rigidity. Lymphadenopathy:      Cervical: No cervical adenopathy. Skin:     General: Skin is warm and dry. Neurological:      General: No focal deficit present. Mental Status: She is alert and oriented to person, place, and time. Psychiatric:         Mood and Affect: Mood normal.         Behavior: Behavior normal.       RADIATION SAFETY AND ONCOLOGIC TREATMENT SUPPORT:    - Previous radiation history: None  - History of autoimmune or connective tissue disease: None  - Nutritional support/ PEG: Not applicable  - Dental evaluation: None  -  requested:  met with the patient today and during rehab. - Oncology Nurse navigator requested:   - Transportation for daily treatment: No issues    TUMOR MARKERS: No results found for: PSA, CEA, , VS4187,     IMAGING REVIEWED: Per HPI    PATHOLOGY REVIEWED: Per HPI    IMPRESSION: This is a 70-year-old female with new diagnosis of invasive ductal carcinoma the right breast, ER-/SD-/HER-2/beny amplified by FISH status post lumpectomy and sentinel lymph node biopsy who presents to discuss the role and rationale of adjuvant radiation therapy in the management of her disease. DISCUSSION/PLAN:   I reviewed the risk, benefits, and workflow of radiation therapy in this setting.   I discussed her pathology in detail and with HER-2 amplification with ER negative status, systemic therapy with HER-2 targeted therapy would be the mainstay of her next phase of treatment. I reserved further details of this discussion for medical oncology as she sees  next week on 12/1/2021. I then discussed adjuvant radiation therapy in helping achieve local regional control and reviewed potential side effects which include but are not limited to fatigue, dermatitis of the skin of the right breast at the beam entry and exit points, breast swelling/chest wall irritation, and potential for radiation pneumonitis in the right lung. I reviewed that the vast majority of these side effects are self-limited and managed conservatively while the patient is on treatment. The patient and her friend who is also present had several questions regarding the plan all of which were answered to their apparent satisfaction. At this time will await recommendations from medical oncology who sees the patient next week after which we will coordinate radiation therapy accordingly. In the interim the patient understands that if she has any additional questions or concerns, we remain available. Thank you very much for the referral and for the opportunity provide care for this patient. A combined total of 65 minutes was spent in preparing this consultation as well as in meeting with the patient and her  in person. Please excuse any typos in this consultation note as voice dictation was used.     Magdiel Grimes MD PHD  Radiation Oncologist, [address] [place] [address] Director    Department [place] [id number]  [address]

## 2021-11-22 NOTE — CONSULTS
NURSING ASSESSMENT     Date: 2021        Patient Name: River Yanez     YOB: 1940      Age:  68 y.o. MRN: [id number]       Chaperone [x] Yes   [] No      Advance Directives:   Do you currently have completed advance directives (living will)? [] Yes   [x] No         *If yes, please bring us a copy for your records. *If no, would you like info or assistance in completing advance directives (living will)? [] Yes   [x] No    Pain Score:   Pain Score (1-10): 0  Pain Location: NA  Pain Duration: NA  Pain Management/Control: NA      Is pain affecting your ability to take care of yourself or move throughout your home? 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 Bandaids covering both areas. ENT: No Problems     Respiratory: No Problems     Cardiovascular: No Problems      Device   [] Yes   [x] No   Copy of Card Obtained [] Yes   [x] No    Gastrointestinal: No Problems    Genito-Urinary:   No Problems       Breast: Right breast lumpectomy and SLN biopsy on 10/28/21     Musculoskeletal: 2021 surgery for right foot bunion and hammer toe    Neurological: No Problems      Hematological and Lymphatic: No Problems     Endocrine: No Problems     Gyn History:   /Para: 3/3  Age of Menarche: 17  Age of Menopause 52  Vaginal Bleeding: none   First Pregnancy: 20  Breast Feeding: : no  Last Menstrual period:52   Oral Contraceptives:no      Number of years:   Hormone Replacement therapy: Yes     Number of Years: 6 months  Last Pap Smear: 2019  Last Ymkcgucxy06/11/21    A 10-point review of systems has been conducted and pertinent positives have been   recorded.  All other review of systems are negative    Was the patient admitted during the course of treatment OR within 30 days of treatment?  NO        Additional Comments:

## 2022-01-05 ENCOUNTER — HOSPITAL ENCOUNTER (OUTPATIENT)
Dept: NON INVASIVE DIAGNOSTICS | Age: 82
Discharge: HOME | End: 2022-01-05
Payer: COMMERCIAL

## 2022-01-05 DIAGNOSIS — D70.1 NEUTROPENIA ASSOCIATED WITH MUCOSITIS DUE TO ANTINEOPLASTIC THERAPY (HCC): ICD-10-CM

## 2022-01-05 DIAGNOSIS — T45.1X5S NEUTROPENIA ASSOCIATED WITH MUCOSITIS DUE TO ANTINEOPLASTIC THERAPY (HCC): ICD-10-CM

## 2022-01-05 DIAGNOSIS — K12.31 NEUTROPENIA ASSOCIATED WITH MUCOSITIS DUE TO ANTINEOPLASTIC THERAPY (HCC): ICD-10-CM

## 2022-01-05 LAB
LV EF: 63 %
LVEF MODALITY: NORMAL

## 2022-01-05 PROCEDURE — 0439T MYOCRD CONTRAST PRFUJ ECHO: CPT

## 2022-01-05 PROCEDURE — 93306 TTE W/DOPPLER COMPLETE: CPT

## 2022-01-26 ENCOUNTER — OFFICE VISIT (OUTPATIENT)
Dept: SURGERY | Age: 82
End: 2022-01-26
Payer: COMMERCIAL

## 2022-01-26 ENCOUNTER — PREP FOR PROCEDURE (OUTPATIENT)
Dept: SURGERY | Age: 82
End: 2022-01-26

## 2022-01-26 VITALS
HEIGHT: 64 IN | DIASTOLIC BLOOD PRESSURE: 84 MMHG | WEIGHT: 185 LBS | SYSTOLIC BLOOD PRESSURE: 130 MMHG | BODY MASS INDEX: 31.58 KG/M2 | TEMPERATURE: 98 F

## 2022-01-26 DIAGNOSIS — I87.8 POOR VENOUS ACCESS: Primary | ICD-10-CM

## 2022-01-26 PROCEDURE — 99203 OFFICE O/P NEW LOW 30 MIN: CPT | Performed by: SURGERY

## 2022-01-26 ASSESSMENT — ENCOUNTER SYMPTOMS
RECTAL PAIN: 0
BLOOD IN STOOL: 0
RHINORRHEA: 0
CHEST TIGHTNESS: 0
NAUSEA: 0
ABDOMINAL PAIN: 0
ALLERGIC/IMMUNOLOGIC NEGATIVE: 1
SHORTNESS OF BREATH: 0
COLOR CHANGE: 0
ABDOMINAL DISTENTION: 0

## 2022-01-27 NOTE — H&P (VIEW-ONLY)
Olvin Maya (:  1940) is a 80 y.o. female,New patient, here for evaluation of the following chief complaint(s):  New Patient (np, port placement disscussion )         ASSESSMENT/PLAN:  Breast cancer  Poor venous access        Insertion of subcutaneous venous port  The options of port placement were discussed and the patient agrees to port placement. The procedure  as well as potential risks and complications including but not exclusive to infection, blood loss and thrombosis which could lead to removal were discussed  The patient understands and is agreeable to the surgery. The patient requests the port on left side. The patient was counseled at length about the risks of lidia Covid-19 in the perioperative period and any recovery window from their procedure. The patient was made aware that lidia Covid-19  may worsen their prognosis for recovering from their procedure  and lend to a higher morbidity and/or mortality risk. The patient was given the options of postponing their procedure. All material risks, benefits, and alternatives were discussed. The patient does wish to proceed with the procedure at this time. Please note this report has been partially produced using speech recognition software and may cause contain errors related to that system including grammar, punctuation and spelling as well as words and phrases that may seem inappropriate. If there are questions or concerns please feel free to contact me to clarify       Subjective   SUBJECTIVE/OBJECTIVE:  YOHANNES Maya is a 80 y.o. female who I am requested to see by Dr George Moreno for subcutaneous venous port placement. The vascular access  is needed for chemotherapy. The last time she went for chemotherapy they were unsuccessful after 8 attempts of finding a peripheral vein. Her diagnosis is breast right cancer. She denies previous port and or other central access device(ie AICD, pacemaker).  She denies arm and or head/neck edema. She denies use of blood thinning medication such as coumadin/plavis/pradaxa. She is right handed. Review of Systems   Constitutional: Negative for activity change, appetite change and unexpected weight change. HENT: Negative for congestion, nosebleeds, rhinorrhea and sneezing. Eyes: Negative for visual disturbance. Respiratory: Negative for chest tightness and shortness of breath. Cardiovascular: Negative for chest pain and leg swelling. Gastrointestinal: Negative for abdominal distention, abdominal pain, blood in stool, nausea and rectal pain. Endocrine: Negative. Genitourinary: Negative for difficulty urinating. Musculoskeletal: Negative. Skin: Negative for color change. Allergic/Immunologic: Negative. Neurological: Negative for seizures, light-headedness, numbness and headaches. Hematological: Does not bruise/bleed easily. Psychiatric/Behavioral: Negative for sleep disturbance.          Past Medical History:   Diagnosis Date    Cancer [facility])     Right breast    Cancer ([address]     skin    Chronic back pain     Urinary incontinence      Past Surgical History:   Procedure Laterality Date    BREAST BIOPSY Right 10/28/2021    RIGHT BREAST LUMPECTOMY AND SENTINEL LYMPH NODE BIOPSY performed by Neal Grider MD at [address] LUMPECTOMY Right 10/28/2021    and SLNB    BUNIONECTOMY Right 09/2021    HAMMER TOE SURGERY Right 09/2021    SPINE SURGERY       Social History     Tobacco Use    Smoking status: Former Smoker    Smokeless tobacco: Never Used    Tobacco comment: Quit 45 years ago as of 11/22/21   Vaping Use    Vaping Use: Never used   Substance Use Topics    Alcohol use: No    Drug use: No     Family History   Problem Relation Age of Onset    Cancer Father     Diabetes Father     Breast Cancer Sister      Atorvastatin, Clonidine, Fenofibrate micronized, Levofloxacin, Raloxifene, and Statins  Allergies   Allergen Reactions    Atorvastatin Muscle pain    Clonidine      SKIN RX.  Fenofibrate Micronized      Muscle pain    Levofloxacin      Other reaction(s): Other: See Comments  ? tendonitis    Raloxifene      EVISTA - HOT FLASHES, MOOD SWINGS    Statins Other (See Comments)     muscle pain     Current Outpatient Medications   Medication Sig Dispense Refill    ezetimibe (ZETIA) 10 MG tablet Take 10 mg by mouth daily      calcium citrate-vitamin D (CITRACAL+D) 315-200 MG-UNIT per tablet Take 1 tablet by mouth      candesartan-hydrochlorothiazide (ATACAND HCT) 32-12.5 MG per tablet       tretinoin (RETIN-A) 0.1 % cream Apply sparingly at bedtime       Current Facility-Administered Medications   Medication Dose Route Frequency Provider Last Rate Last Admin    lidocaine 1 % injection 10 mL  10 mL IntraDERmal Once Jefe Ashraf MD        sodium bicarbonate 8.4 % injection 3 mEq  3 mL IntraVENous Once Jefe Ashraf MD         /84   Temp 98 °F (36.7 °C)   Ht 5' 4\" (1.626 m)   Wt 185 lb (83.9 kg)   BMI 31.76 kg/m²     Objective   Physical Exam  Constitutional:       General: She is not in acute distress. Appearance: Normal appearance. HENT:      Mouth/Throat:      Mouth: Mucous membranes are moist.      Pharynx: Oropharynx is clear. Eyes:      Pupils: Pupils are equal, round, and reactive to light. Neck:      Comments: Neck is supple with out masses, no thyromegaly, trachea midline  Cardiovascular:      Rate and Rhythm: Normal rate. Heart sounds: No murmur heard. Pulmonary:      Effort: Pulmonary effort is normal. No respiratory distress. Breath sounds: Normal breath sounds. Chest:   Breasts:      Breasts are asymmetrical (partial right mastectomy). Musculoskeletal:      Comments: Normal gait   Skin:     Findings: No bruising, lesion or rash. Neurological:      Mental Status: She is alert and oriented to person, place, and time.    Psychiatric:         Mood and Affect: Mood normal.         Judgment: Judgment normal.        An electronic signature was used to authenticate this note.     --Rosey Gibson MD

## 2022-01-27 NOTE — PROGRESS NOTES
Elbert Guan (:  1940) is a 80 y.o. female,New patient, here for evaluation of the following chief complaint(s):  New Patient (np, port placement disscussion )         ASSESSMENT/PLAN:  Breast cancer  Poor venous access        Insertion of subcutaneous venous port  The options of port placement were discussed and the patient agrees to port placement. The procedure  as well as potential risks and complications including but not exclusive to infection, blood loss and thrombosis which could lead to removal were discussed  The patient understands and is agreeable to the surgery. The patient requests the port on left side. The patient was counseled at length about the risks of lidia Covid-19 in the perioperative period and any recovery window from their procedure. The patient was made aware that lidia Covid-19  may worsen their prognosis for recovering from their procedure  and lend to a higher morbidity and/or mortality risk. The patient was given the options of postponing their procedure. All material risks, benefits, and alternatives were discussed. The patient does wish to proceed with the procedure at this time. Please note this report has been partially produced using speech recognition software and may cause contain errors related to that system including grammar, punctuation and spelling as well as words and phrases that may seem inappropriate. If there are questions or concerns please feel free to contact me to clarify       Subjective   SUBJECTIVE/OBJECTIVE:  HPI  Elbert Guan is a 80 y.o. female who I am requested to see by Dr Douglas Lagos for subcutaneous venous port placement. The vascular access  is needed for chemotherapy. The last time she went for chemotherapy they were unsuccessful after 8 attempts of finding a peripheral vein. Her diagnosis is breast right cancer. She denies previous port and or other central access device(ie AICD, pacemaker).  She denies arm and or head/neck edema. She denies use of blood thinning medication such as coumadin/plavis/pradaxa. She is right handed. Review of Systems   Constitutional: Negative for activity change, appetite change and unexpected weight change. HENT: Negative for congestion, nosebleeds, rhinorrhea and sneezing. Eyes: Negative for visual disturbance. Respiratory: Negative for chest tightness and shortness of breath. Cardiovascular: Negative for chest pain and leg swelling. Gastrointestinal: Negative for abdominal distention, abdominal pain, blood in stool, nausea and rectal pain. Endocrine: Negative. Genitourinary: Negative for difficulty urinating. Musculoskeletal: Negative. Skin: Negative for color change. Allergic/Immunologic: Negative. Neurological: Negative for seizures, light-headedness, numbness and headaches. Hematological: Does not bruise/bleed easily. Psychiatric/Behavioral: Negative for sleep disturbance.          Past Medical History:   Diagnosis Date    Cancer [facility])     Right breast    Cancer ([address]     skin    Chronic back pain     Urinary incontinence      Past Surgical History:   Procedure Laterality Date    BREAST BIOPSY Right 10/28/2021    RIGHT BREAST LUMPECTOMY AND SENTINEL LYMPH NODE BIOPSY performed by Magui Hu MD at [address] LUMPECTOMY Right 10/28/2021    and SLNB    BUNIONECTOMY Right 09/2021    HAMMER TOE SURGERY Right 09/2021    SPINE SURGERY       Social History     Tobacco Use    Smoking status: Former Smoker    Smokeless tobacco: Never Used    Tobacco comment: Quit 45 years ago as of 11/22/21   Vaping Use    Vaping Use: Never used   Substance Use Topics    Alcohol use: No    Drug use: No     Family History   Problem Relation Age of Onset    Cancer Father     Diabetes Father     Breast Cancer Sister      Atorvastatin, Clonidine, Fenofibrate micronized, Levofloxacin, Raloxifene, and Statins  Allergies   Allergen Reactions    Atorvastatin Muscle pain    Clonidine      SKIN RX.  Fenofibrate Micronized      Muscle pain    Levofloxacin      Other reaction(s): Other: See Comments  ? tendonitis    Raloxifene      EVISTA - HOT FLASHES, MOOD SWINGS    Statins Other (See Comments)     muscle pain     Current Outpatient Medications   Medication Sig Dispense Refill    ezetimibe (ZETIA) 10 MG tablet Take 10 mg by mouth daily      calcium citrate-vitamin D (CITRACAL+D) 315-200 MG-UNIT per tablet Take 1 tablet by mouth      candesartan-hydrochlorothiazide (ATACAND HCT) 32-12.5 MG per tablet       tretinoin (RETIN-A) 0.1 % cream Apply sparingly at bedtime       Current Facility-Administered Medications   Medication Dose Route Frequency Provider Last Rate Last Admin    lidocaine 1 % injection 10 mL  10 mL IntraDERmal Once Zheng Laguerre MD        sodium bicarbonate 8.4 % injection 3 mEq  3 mL IntraVENous Once Zheng Laguerre MD         /84   Temp 98 °F (36.7 °C)   Ht 5' 4\" (1.626 m)   Wt 185 lb (83.9 kg)   BMI 31.76 kg/m²     Objective   Physical Exam  Constitutional:       General: She is not in acute distress. Appearance: Normal appearance. HENT:      Mouth/Throat:      Mouth: Mucous membranes are moist.      Pharynx: Oropharynx is clear. Eyes:      Pupils: Pupils are equal, round, and reactive to light. Neck:      Comments: Neck is supple with out masses, no thyromegaly, trachea midline  Cardiovascular:      Rate and Rhythm: Normal rate. Heart sounds: No murmur heard. Pulmonary:      Effort: Pulmonary effort is normal. No respiratory distress. Breath sounds: Normal breath sounds. Chest:   Breasts:      Breasts are asymmetrical (partial right mastectomy). Musculoskeletal:      Comments: Normal gait   Skin:     Findings: No bruising, lesion or rash. Neurological:      Mental Status: She is alert and oriented to person, place, and time.    Psychiatric:         Mood and Affect: Mood normal.         Judgment: Judgment normal.        An electronic signature was used to authenticate this note.     --Yoshi Waddell MD

## 2022-02-01 ENCOUNTER — APPOINTMENT (OUTPATIENT)
Dept: GENERAL RADIOLOGY | Age: 82
End: 2022-02-01
Attending: SURGERY
Payer: COMMERCIAL

## 2022-02-01 ENCOUNTER — HOSPITAL ENCOUNTER (OUTPATIENT)
Dept: GENERAL RADIOLOGY | Age: 82
Setting detail: OUTPATIENT SURGERY
Discharge: HOME | End: 2022-02-03
Attending: SURGERY
Payer: COMMERCIAL

## 2022-02-01 ENCOUNTER — ANESTHESIA (OUTPATIENT)
Dept: OPERATING ROOM | Age: 82
End: 2022-02-01
Payer: COMMERCIAL

## 2022-02-01 ENCOUNTER — ANESTHESIA EVENT (OUTPATIENT)
Dept: OPERATING ROOM | Age: 82
End: 2022-02-01
Payer: COMMERCIAL

## 2022-02-01 ENCOUNTER — HOSPITAL ENCOUNTER (OUTPATIENT)
Age: 82
Setting detail: OUTPATIENT SURGERY
Discharge: HOME | End: 2022-02-01
Attending: SURGERY | Admitting: SURGERY
Payer: COMMERCIAL

## 2022-02-01 VITALS
OXYGEN SATURATION: 99 % | SYSTOLIC BLOOD PRESSURE: 97 MMHG | DIASTOLIC BLOOD PRESSURE: 53 MMHG | RESPIRATION RATE: 11 BRPM

## 2022-02-01 VITALS
BODY MASS INDEX: 30.39 KG/M2 | HEART RATE: 69 BPM | WEIGHT: 178 LBS | TEMPERATURE: 97 F | HEIGHT: 64 IN | SYSTOLIC BLOOD PRESSURE: 147 MMHG | DIASTOLIC BLOOD PRESSURE: 76 MMHG | OXYGEN SATURATION: 100 % | RESPIRATION RATE: 19 BRPM

## 2022-02-01 DIAGNOSIS — R52 PAIN: ICD-10-CM

## 2022-02-01 LAB
ANION GAP SERPL CALCULATED.3IONS-SCNC: 13 MEQ/L (ref 9–15)
BUN BLDV-MCNC: 20 MG/DL (ref 8–23)
CALCIUM SERPL-MCNC: 9.4 MG/DL (ref 8.5–9.9)
CHLORIDE BLD-SCNC: 106 MEQ/L (ref 95–107)
CO2: 24 MEQ/L (ref 20–31)
CREAT SERPL-MCNC: 0.94 MG/DL (ref 0.5–0.9)
EKG ATRIAL RATE: 84 BPM
EKG P AXIS: 63 DEGREES
EKG P-R INTERVAL: 156 MS
EKG Q-T INTERVAL: 376 MS
EKG QRS DURATION: 88 MS
EKG QTC CALCULATION (BAZETT): 444 MS
EKG R AXIS: 59 DEGREES
EKG T AXIS: 66 DEGREES
EKG VENTRICULAR RATE: 84 BPM
GFR AFRICAN AMERICAN: >60
GFR NON-AFRICAN AMERICAN: 57.4
GLUCOSE BLD-MCNC: 131 MG/DL (ref 70–99)
HCT VFR BLD CALC: 38.3 % (ref 37–47)
HEMOGLOBIN: 12.7 G/DL (ref 12–16)
MCH RBC QN AUTO: 28.9 PG (ref 27–31.3)
MCHC RBC AUTO-ENTMCNC: 33.2 % (ref 33–37)
MCV RBC AUTO: 86.9 FL (ref 82–100)
PDW BLD-RTO: 13.7 % (ref 11.5–14.5)
PLATELET # BLD: 209 K/UL (ref 130–400)
POTASSIUM SERPL-SCNC: 4.1 MEQ/L (ref 3.4–4.9)
RBC # BLD: 4.41 M/UL (ref 4.2–5.4)
SARS-COV-2, NAAT: NOT DETECTED
SODIUM BLD-SCNC: 143 MEQ/L (ref 135–144)
WBC # BLD: 7.1 K/UL (ref 4.8–10.8)

## 2022-02-01 PROCEDURE — 3600000003 HC SURGERY LEVEL 3 BASE: Performed by: SURGERY

## 2022-02-01 PROCEDURE — 3700000000 HC ANESTHESIA ATTENDED CARE: Performed by: SURGERY

## 2022-02-01 PROCEDURE — 2500000003 HC RX 250 WO HCPCS: Performed by: SURGERY

## 2022-02-01 PROCEDURE — 3209999900 FLUORO FOR SURGICAL PROCEDURES

## 2022-02-01 PROCEDURE — 87635 SARS-COV-2 COVID-19 AMP PRB: CPT

## 2022-02-01 PROCEDURE — 6360000002 HC RX W HCPCS: Performed by: STUDENT IN AN ORGANIZED HEALTH CARE EDUCATION/TRAINING PROGRAM

## 2022-02-01 PROCEDURE — 3700000001 HC ADD 15 MINUTES (ANESTHESIA): Performed by: SURGERY

## 2022-02-01 PROCEDURE — 3600000013 HC SURGERY LEVEL 3 ADDTL 15MIN: Performed by: SURGERY

## 2022-02-01 PROCEDURE — A4217 STERILE WATER/SALINE, 500 ML: HCPCS | Performed by: SURGERY

## 2022-02-01 PROCEDURE — 7100000011 HC PHASE II RECOVERY - ADDTL 15 MIN: Performed by: SURGERY

## 2022-02-01 PROCEDURE — 2580000003 HC RX 258: Performed by: STUDENT IN AN ORGANIZED HEALTH CARE EDUCATION/TRAINING PROGRAM

## 2022-02-01 PROCEDURE — 36571 INSERT PICVAD CATH: CPT | Performed by: SURGERY

## 2022-02-01 PROCEDURE — 7100000010 HC PHASE II RECOVERY - FIRST 15 MIN: Performed by: SURGERY

## 2022-02-01 PROCEDURE — 77001 FLUOROGUIDE FOR VEIN DEVICE: CPT | Performed by: SURGERY

## 2022-02-01 PROCEDURE — C1788 PORT, INDWELLING, IMP: HCPCS | Performed by: SURGERY

## 2022-02-01 PROCEDURE — 7100000001 HC PACU RECOVERY - ADDTL 15 MIN: Performed by: SURGERY

## 2022-02-01 PROCEDURE — 6360000002 HC RX W HCPCS: Performed by: SURGERY

## 2022-02-01 PROCEDURE — 80048 BASIC METABOLIC PNL TOTAL CA: CPT

## 2022-02-01 PROCEDURE — 93010 ELECTROCARDIOGRAM REPORT: CPT | Performed by: INTERNAL MEDICINE

## 2022-02-01 PROCEDURE — 2709999900 HC NON-CHARGEABLE SUPPLY: Performed by: SURGERY

## 2022-02-01 PROCEDURE — 2580000003 HC RX 258: Performed by: SURGERY

## 2022-02-01 PROCEDURE — 85027 COMPLETE CBC AUTOMATED: CPT

## 2022-02-01 PROCEDURE — 93005 ELECTROCARDIOGRAM TRACING: CPT | Performed by: STUDENT IN AN ORGANIZED HEALTH CARE EDUCATION/TRAINING PROGRAM

## 2022-02-01 PROCEDURE — 7100000000 HC PACU RECOVERY - FIRST 15 MIN: Performed by: SURGERY

## 2022-02-01 PROCEDURE — 6370000000 HC RX 637 (ALT 250 FOR IP): Performed by: STUDENT IN AN ORGANIZED HEALTH CARE EDUCATION/TRAINING PROGRAM

## 2022-02-01 PROCEDURE — 71045 X-RAY EXAM CHEST 1 VIEW: CPT

## 2022-02-01 DEVICE — PORT INFUS 7ML VOL L66CM ODSEC96FR IDSEC16MM CATHETER 10FR: Type: IMPLANTABLE DEVICE | Site: SUBCLAVIAN | Status: FUNCTIONAL

## 2022-02-01 RX ORDER — SODIUM CHLORIDE 9 MG/ML
25 INJECTION, SOLUTION INTRAVENOUS PRN
Status: DISCONTINUED | OUTPATIENT
Start: 2022-02-01 | End: 2022-02-01 | Stop reason: HOSPADM

## 2022-02-01 RX ORDER — BUPIVACAINE HYDROCHLORIDE AND EPINEPHRINE 2.5; 5 MG/ML; UG/ML
INJECTION, SOLUTION EPIDURAL; INFILTRATION; INTRACAUDAL; PERINEURAL PRN
Status: DISCONTINUED | OUTPATIENT
Start: 2022-02-01 | End: 2022-02-01 | Stop reason: ALTCHOICE

## 2022-02-01 RX ORDER — PROCHLORPERAZINE MALEATE 10 MG
TABLET ORAL
COMMUNITY
Start: 2021-12-29

## 2022-02-01 RX ORDER — FENTANYL CITRATE 50 UG/ML
INJECTION, SOLUTION INTRAMUSCULAR; INTRAVENOUS PRN
Status: DISCONTINUED | OUTPATIENT
Start: 2022-02-01 | End: 2022-02-01 | Stop reason: SDUPTHER

## 2022-02-01 RX ORDER — SODIUM CHLORIDE 0.9 % (FLUSH) 0.9 %
10 SYRINGE (ML) INJECTION EVERY 12 HOURS SCHEDULED
Status: CANCELLED | OUTPATIENT
Start: 2022-02-01

## 2022-02-01 RX ORDER — SODIUM CHLORIDE, SODIUM LACTATE, POTASSIUM CHLORIDE, CALCIUM CHLORIDE 600; 310; 30; 20 MG/100ML; MG/100ML; MG/100ML; MG/100ML
INJECTION, SOLUTION INTRAVENOUS CONTINUOUS
Status: DISCONTINUED | OUTPATIENT
Start: 2022-02-01 | End: 2022-02-01 | Stop reason: HOSPADM

## 2022-02-01 RX ORDER — MORPHINE SULFATE 2 MG/ML
1 INJECTION, SOLUTION INTRAMUSCULAR; INTRAVENOUS
Status: DISCONTINUED | OUTPATIENT
Start: 2022-02-01 | End: 2022-02-01 | Stop reason: HOSPADM

## 2022-02-01 RX ORDER — LIDOCAINE HYDROCHLORIDE 10 MG/ML
1 INJECTION, SOLUTION EPIDURAL; INFILTRATION; INTRACAUDAL; PERINEURAL
Status: DISCONTINUED | OUTPATIENT
Start: 2022-02-01 | End: 2022-02-01 | Stop reason: HOSPADM

## 2022-02-01 RX ORDER — ONDANSETRON 2 MG/ML
4 INJECTION INTRAMUSCULAR; INTRAVENOUS
Status: COMPLETED | OUTPATIENT
Start: 2022-02-01 | End: 2022-02-01

## 2022-02-01 RX ORDER — KETOROLAC TROMETHAMINE 15 MG/ML
15 INJECTION, SOLUTION INTRAMUSCULAR; INTRAVENOUS
Status: COMPLETED | OUTPATIENT
Start: 2022-02-01 | End: 2022-02-01

## 2022-02-01 RX ORDER — SODIUM CHLORIDE 0.9 % (FLUSH) 0.9 %
10 SYRINGE (ML) INJECTION PRN
Status: DISCONTINUED | OUTPATIENT
Start: 2022-02-01 | End: 2022-02-01 | Stop reason: HOSPADM

## 2022-02-01 RX ORDER — PROPOFOL 10 MG/ML
INJECTION, EMULSION INTRAVENOUS PRN
Status: DISCONTINUED | OUTPATIENT
Start: 2022-02-01 | End: 2022-02-01 | Stop reason: SDUPTHER

## 2022-02-01 RX ORDER — METOCLOPRAMIDE HYDROCHLORIDE 5 MG/ML
10 INJECTION INTRAMUSCULAR; INTRAVENOUS
Status: DISCONTINUED | OUTPATIENT
Start: 2022-02-01 | End: 2022-02-01 | Stop reason: HOSPADM

## 2022-02-01 RX ORDER — ONDANSETRON 2 MG/ML
4 INJECTION INTRAMUSCULAR; INTRAVENOUS EVERY 6 HOURS PRN
Status: DISCONTINUED | OUTPATIENT
Start: 2022-02-01 | End: 2022-02-01 | Stop reason: HOSPADM

## 2022-02-01 RX ORDER — HYDROCODONE BITARTRATE AND ACETAMINOPHEN 5; 325 MG/1; MG/1
2 TABLET ORAL PRN
Status: COMPLETED | OUTPATIENT
Start: 2022-02-01 | End: 2022-02-01

## 2022-02-01 RX ORDER — ONDANSETRON 2 MG/ML
INJECTION INTRAMUSCULAR; INTRAVENOUS PRN
Status: DISCONTINUED | OUTPATIENT
Start: 2022-02-01 | End: 2022-02-01 | Stop reason: SDUPTHER

## 2022-02-01 RX ORDER — MIDAZOLAM HYDROCHLORIDE 1 MG/ML
INJECTION INTRAMUSCULAR; INTRAVENOUS PRN
Status: DISCONTINUED | OUTPATIENT
Start: 2022-02-01 | End: 2022-02-01 | Stop reason: SDUPTHER

## 2022-02-01 RX ORDER — MAGNESIUM HYDROXIDE 1200 MG/15ML
LIQUID ORAL CONTINUOUS PRN
Status: COMPLETED | OUTPATIENT
Start: 2022-02-01 | End: 2022-02-01

## 2022-02-01 RX ORDER — TRAMADOL HYDROCHLORIDE 50 MG/1
25 TABLET ORAL EVERY 6 HOURS PRN
Status: DISCONTINUED | OUTPATIENT
Start: 2022-02-01 | End: 2022-02-01 | Stop reason: HOSPADM

## 2022-02-01 RX ORDER — SODIUM CHLORIDE, SODIUM LACTATE, POTASSIUM CHLORIDE, CALCIUM CHLORIDE 600; 310; 30; 20 MG/100ML; MG/100ML; MG/100ML; MG/100ML
INJECTION, SOLUTION INTRAVENOUS CONTINUOUS
Status: CANCELLED | OUTPATIENT
Start: 2022-02-01

## 2022-02-01 RX ORDER — HYDROCODONE BITARTRATE AND ACETAMINOPHEN 5; 325 MG/1; MG/1
1 TABLET ORAL PRN
Status: COMPLETED | OUTPATIENT
Start: 2022-02-01 | End: 2022-02-01

## 2022-02-01 RX ORDER — TOLTERODINE 4 MG/1
CAPSULE, EXTENDED RELEASE ORAL
COMMUNITY
Start: 2021-11-17

## 2022-02-01 RX ORDER — SODIUM CHLORIDE 0.9 % (FLUSH) 0.9 %
10 SYRINGE (ML) INJECTION PRN
Status: CANCELLED | OUTPATIENT
Start: 2022-02-01

## 2022-02-01 RX ORDER — DIPHENHYDRAMINE HYDROCHLORIDE 50 MG/ML
12.5 INJECTION INTRAMUSCULAR; INTRAVENOUS
Status: DISCONTINUED | OUTPATIENT
Start: 2022-02-01 | End: 2022-02-01 | Stop reason: HOSPADM

## 2022-02-01 RX ORDER — SODIUM CHLORIDE 9 MG/ML
25 INJECTION, SOLUTION INTRAVENOUS PRN
Status: CANCELLED | OUTPATIENT
Start: 2022-02-01

## 2022-02-01 RX ORDER — PROMETHAZINE HYDROCHLORIDE 12.5 MG/1
12.5 TABLET ORAL EVERY 6 HOURS PRN
Status: DISCONTINUED | OUTPATIENT
Start: 2022-02-01 | End: 2022-02-01 | Stop reason: HOSPADM

## 2022-02-01 RX ORDER — MEPERIDINE HYDROCHLORIDE 25 MG/ML
12.5 INJECTION INTRAMUSCULAR; INTRAVENOUS; SUBCUTANEOUS EVERY 5 MIN PRN
Status: DISCONTINUED | OUTPATIENT
Start: 2022-02-01 | End: 2022-02-01 | Stop reason: HOSPADM

## 2022-02-01 RX ORDER — HEPARIN SODIUM (PORCINE) LOCK FLUSH IV SOLN 100 UNIT/ML 100 UNIT/ML
SOLUTION INTRAVENOUS PRN
Status: DISCONTINUED | OUTPATIENT
Start: 2022-02-01 | End: 2022-02-01 | Stop reason: ALTCHOICE

## 2022-02-01 RX ORDER — SODIUM CHLORIDE 0.9 % (FLUSH) 0.9 %
10 SYRINGE (ML) INJECTION EVERY 12 HOURS SCHEDULED
Status: DISCONTINUED | OUTPATIENT
Start: 2022-02-01 | End: 2022-02-01 | Stop reason: HOSPADM

## 2022-02-01 RX ORDER — FENTANYL CITRATE 50 UG/ML
50 INJECTION, SOLUTION INTRAMUSCULAR; INTRAVENOUS EVERY 10 MIN PRN
Status: DISCONTINUED | OUTPATIENT
Start: 2022-02-01 | End: 2022-02-01 | Stop reason: HOSPADM

## 2022-02-01 RX ADMIN — ONDANSETRON 4 MG: 2 INJECTION INTRAMUSCULAR; INTRAVENOUS at 11:35

## 2022-02-01 RX ADMIN — FENTANYL CITRATE 25 MCG: 50 INJECTION, SOLUTION INTRAMUSCULAR; INTRAVENOUS at 11:06

## 2022-02-01 RX ADMIN — KETOROLAC TROMETHAMINE 15 MG: 15 INJECTION, SOLUTION INTRAMUSCULAR; INTRAVENOUS at 09:52

## 2022-02-01 RX ADMIN — CEFAZOLIN 2 G: 10 INJECTION, POWDER, FOR SOLUTION INTRAVENOUS at 11:11

## 2022-02-01 RX ADMIN — PROPOFOL 200 MG: 10 INJECTION, EMULSION INTRAVENOUS at 11:06

## 2022-02-01 RX ADMIN — SODIUM CHLORIDE, POTASSIUM CHLORIDE, SODIUM LACTATE AND CALCIUM CHLORIDE: 600; 310; 30; 20 INJECTION, SOLUTION INTRAVENOUS at 11:01

## 2022-02-01 RX ADMIN — FENTANYL CITRATE 25 MCG: 50 INJECTION, SOLUTION INTRAMUSCULAR; INTRAVENOUS at 11:14

## 2022-02-01 RX ADMIN — FENTANYL CITRATE 50 MCG: 50 INJECTION, SOLUTION INTRAMUSCULAR; INTRAVENOUS at 12:15

## 2022-02-01 RX ADMIN — HYDROCODONE BITARTRATE AND ACETAMINOPHEN 2 TABLET: 5; 325 TABLET ORAL at 12:57

## 2022-02-01 RX ADMIN — MIDAZOLAM HYDROCHLORIDE 2 MG: 1 INJECTION, SOLUTION INTRAMUSCULAR; INTRAVENOUS at 11:02

## 2022-02-01 RX ADMIN — ONDANSETRON 4 MG: 2 INJECTION INTRAMUSCULAR; INTRAVENOUS at 12:37

## 2022-02-01 RX ADMIN — FENTANYL CITRATE 50 MCG: 50 INJECTION, SOLUTION INTRAMUSCULAR; INTRAVENOUS at 11:41

## 2022-02-01 ASSESSMENT — PULMONARY FUNCTION TESTS
PIF_VALUE: 14
PIF_VALUE: 15
PIF_VALUE: 12
PIF_VALUE: 14
PIF_VALUE: 12
PIF_VALUE: 14
PIF_VALUE: 10
PIF_VALUE: 1
PIF_VALUE: 14
PIF_VALUE: 14
PIF_VALUE: 12
PIF_VALUE: 14
PIF_VALUE: 0
PIF_VALUE: 14
PIF_VALUE: 1
PIF_VALUE: 14
PIF_VALUE: 12
PIF_VALUE: 14
PIF_VALUE: 14
PIF_VALUE: 3
PIF_VALUE: 1
PIF_VALUE: 14
PIF_VALUE: 14
PIF_VALUE: 2
PIF_VALUE: 1
PIF_VALUE: 14
PIF_VALUE: 3
PIF_VALUE: 14
PIF_VALUE: 14
PIF_VALUE: 3
PIF_VALUE: 1
PIF_VALUE: 14

## 2022-02-01 ASSESSMENT — PAIN SCALES - GENERAL
PAINLEVEL_OUTOF10: 0
PAINLEVEL_OUTOF10: 6
PAINLEVEL_OUTOF10: 6
PAINLEVEL_OUTOF10: 7
PAINLEVEL_OUTOF10: 7
PAINLEVEL_OUTOF10: 5

## 2022-02-01 ASSESSMENT — PAIN - FUNCTIONAL ASSESSMENT: PAIN_FUNCTIONAL_ASSESSMENT: 0-10

## 2022-02-01 NOTE — PROGRESS NOTES
Renal Adjustment Per Protocol: Toradol 30mg IV preop changed to Toradol 15mg IV preop based on max dosing recommendation for patients 65 years and older. MONY Covarrubias Ph.  2/1/2022  3:32 AM

## 2022-02-01 NOTE — ANESTHESIA PRE PROCEDURE
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, estrogen receptor negative ([address] C50.411, Z17.1    Poor venous access I87.8       Past Medical History:        Diagnosis Date    Cancer ([address]     Right breast    Cancer [address] [address] [facility]     skin    Chronic back pain     Urinary incontinence        Past Surgical History:        Procedure Laterality Date    BREAST BIOPSY Right 10/28/2021    RIGHT BREAST LUMPECTOMY AND SENTINEL LYMPH NODE BIOPSY performed by Enoch Mitchell MD at [address] LUMPECTOMY Right 10/28/2021    and SLNB    BUNIONECTOMY Right 09/2021    HAMMER TOE SURGERY Right 09/2021    SPINE SURGERY         Social History:    Social History     Tobacco Use    Smoking status: Former Smoker    Smokeless tobacco: Never Used    Tobacco comment: Quit 45 years ago as of 11/22/21   Substance Use Topics    Alcohol use:  No                                Counseling given: Not Answered  Comment: Quit 45 years ago as of 11/22/21      Vital Signs (Current):   Vitals:    02/01/22 0907   BP: (!) 190/90   Pulse: 88   Resp: 16   Temp: 98.9 °F (37.2 °C)   TempSrc: Temporal   SpO2: 96%   Weight: 178 lb (80.7 kg)   Height: 5' 4\" (1.626 m)                                              BP Readings from Last 3 Encounters:   02/01/22 (!) 190/90   01/26/22 130/84   11/22/21 139/86       NPO Status: Time of last liquid consumption: 2100                        Time of last solid consumption: 1930                        Date of last liquid consumption: 01/31/22                        Date of last solid food consumption: 01/31/22    BMI:   Wt Readings from Last 3 Encounters:   02/01/22 178 lb (80.7 kg)   01/26/22 185 lb (83.9 kg)   11/22/21 186 lb 9.6 oz (84.6 kg)     Body mass index is 30.55 kg/m². CBC:   Lab Results   Component Value Date    WBC 7.1 02/01/2022    RBC 4.41 02/01/2022    HGB 12.7 02/01/2022    HCT 38.3 02/01/2022    MCV 86.9 02/01/2022    RDW 13.7 02/01/2022     02/01/2022       CMP:   Lab Results   Component Value Date     02/01/2022    K 4.1 02/01/2022     02/01/2022    CO2 24 02/01/2022    BUN 20 02/01/2022    CREATININE 0.94 02/01/2022    GFRAA >60.0 02/01/2022    LABGLOM 57.4 02/01/2022    GLUCOSE 131 02/01/2022    CALCIUM 9.4 02/01/2022       POC Tests: No results for input(s): POCGLU, POCNA, POCK, POCCL, POCBUN, POCHEMO, POCHCT in the last 72 hours. Coags: No results found for: PROTIME, INR, APTT    HCG (If Applicable): No results found for: PREGTESTUR, PREGSERUM, HCG, HCGQUANT     ABGs: No results found for: PHART, PO2ART, VDP3ASZ, WML3FJD, BEART, D2HQJZKM     Type & Screen (If Applicable):  No results found for: LABABO, LABRH    Drug/Infectious Status (If Applicable):  No results found for: HIV, HEPCAB    COVID-19 Screening (If Applicable):   Lab Results   Component Value Date    COVID19 Not Detected 02/01/2022           Anesthesia Evaluation  Patient summary reviewed and Nursing notes reviewed no history of anesthetic complications:   Airway: Mallampati: II  TM distance: >3 FB   Neck ROM: full  Mouth opening: > = 3 FB Dental: normal exam         Pulmonary:Negative Pulmonary ROS and normal exam                               Cardiovascular:  Exercise tolerance: good (>4 METS),   (+) hypertension:, hyperlipidemia      ECG reviewed               Beta Blocker:  Not on Beta Blocker      ROS comment: Left Ventricle  Normal left ventricle structure and function. Left ventricular ejection fraction is visually estimated at 60-65%. E/A flow reversal noted.  Suggestive of diastolic dysfunction. GLS -20.2%  Right Ventricle  Normal right ventricle structure and function. Normal right ventricle systolic pressure. Left Atrium  Normal left atrium. Right Atrium  Normal right atrium. Mitral Valve  Mitral annular calcification is present. Tricuspid Valve  Normal tricuspid valve structure and function. Aortic Valve  Normal aortic valve structure and function. Pulmonic Valve  The pulmonic valve was not well visualized . Pericardial Effusion  No evidence of pericardial effusion. Pleural Effusion  No evidence of pleural effusion. Aorta \ Miscellaneous  The aorta is within normal limits. Neuro/Psych:   Negative Neuro/Psych ROS              GI/Hepatic/Renal: Neg GI/Hepatic/Renal ROS            Endo/Other:    (+) malignancy/cancer. Pt had PAT visit. Abdominal:             Vascular: negative vascular ROS. Other Findings:             Anesthesia Plan      general     ASA 2     (LMA)  Induction: intravenous. MIPS: Postoperative opioids intended and Prophylactic antiemetics administered. Anesthetic plan and risks discussed with patient. Plan discussed with CRNA.     Attending anesthesiologist reviewed and agrees with Delta Lewis DO   2/1/2022

## 2022-02-01 NOTE — ANESTHESIA POSTPROCEDURE EVALUATION
Department of Anesthesiology  Postprocedure Note    Patient: Elbert Guan  MRN: [id number]  YOB: 1940  Date of evaluation: 2/1/2022  Time:  11:55 AM     Procedure Summary     Date: 02/01/22 Room / Location: [facility] [place] [facility]    Anesthesia Start: 2897 Anesthesia Stop:     Procedure: INSERTION SUBCUTANEOUS VENOUS PORT (Left Chest) Diagnosis: (POOR VENOUS ACCESS)    Surgeons: Kylah Wong MD Responsible Provider: Alta Bob DO    Anesthesia Type: general ASA Status: 2          Anesthesia Type: General    Kellie Phase I:   9    Kellie Phase II:      Last vitals: Reviewed and per EMR flowsheets.        Anesthesia Post Evaluation    Patient location during evaluation: bedside  Patient participation: complete - patient participated  Level of consciousness: awake and awake and alert  Pain score: 0  Airway patency: patent  Nausea & Vomiting: no nausea and no vomiting  Complications: no  Cardiovascular status: blood pressure returned to baseline and hemodynamically stable  Respiratory status: acceptable  Hydration status: euvolemic

## 2022-02-01 NOTE — OP NOTE
[address]                      [address], [address] [facility]                                OPERATIVE REPORT    PATIENT NAME: Marino Carballo                    :        1940  MED REC NO:   [id number]                            ROOM:  ACCOUNT NO:   [de-identified]                           ADMIT DATE: 2022  PROVIDER:     Madisyn Atwood MD    DATE OF PROCEDURE:  2022    PREOPERATIVE DIAGNOSES:  Breast cancer and poor venous access. POSTOPERATIVE DIAGNOSES:  Breast cancer and poor venous access. PROCEDURE:  Insertion of subcutaneous venous port with x-ray guidance. SURGEON:  Madisyn Atwood MD    ANESTHESIA:  General with local.    COMPLICATIONS:  None. ESTIMATED BLOOD LOSS:  Minimal.    HISTORY:  The patient is a 68-year-old female who was diagnosed with  breast cancer and has been getting chemotherapy peripherally, but they  can no longer find peripheral veins for her chemotherapy. After discussing with her her options  of therapy, she is agreeable to subcutaneous venous port placement. The  procedure as well as the risks and complications were discussed  including but not exclusive to infection, blood loss, thrombosis of the  port, and even the possibility of needing it removed were all discussed. She understood and was agreeable to the procedure. OPERATIVE PROCEDURE:  The patient brought in the operative suite, placed  in the supine position where anesthesia was induced and she was  intubated. The left anterior chest wall and neck area were prepped and  draped in a sterile fashion. Time-out called. The patient and  procedure were properly identified. Afterwards, 0.25% Marcaine with  epinephrine was used to infiltrate the left anterior upper chest wall  area just medial to the coracoid process. A transverse incision was  then made and carried down through the subcutaneous tissue to the  deltopectoral groove.   Within the deltopectoral groove, this vein was  identified. The cephalic vein was then tied off distally with a 2-0  silk tie and proximally control was gained with a tie. I then used a  9.6-[place] Port approximately 20-cm port and threaded it into the  cephalic vein and under fluoroscopic guidance into the superior vena  cava. It was sutured at about 18 cm gretchen on the catheter. Afterwards  the catheter was attached to the port and there was good venous return  and irrigated easily. It was then placed into a subcutaneous venous  pocket just inferior to the incision and anterior to the pectoralis  major muscle. This was sutured into place to the fascia using  interrupted 4-0 silk sutures circumferentially. Afterwards the catheter  was checked for patency and it was still patent with excellent flow. I  then sutured the catheter to the vein with a 2-0 silk tie. A 3-0 Vicryl  was then used to close the subcutaneous pocket with the port in it and  3-0 Vicryl and 4-0 Monocryl with skin glue on the skin edges were used. She tolerated the procedure well, went to recovery in stable condition  and I spoke with her daughters afterwards in the holding room.         Eden Camacho MD    D: 02/01/2022 12:08:21       T: 02/01/2022 12:10:54     [email or web address]  Job#: [id number]     Doc#: [id number]    CC:

## 2022-02-01 NOTE — INTERVAL H&P NOTE
Update History & Physical    The patient's History and Physical of January 26, 2022 was reviewed with the patient and I examined the patient. There was no change. The surgical site was confirmed by the patient and me. Plan: The risks, benefits, expected outcome, and alternative to the recommended procedure have been discussed with the patient. Patient understands and wants to proceed with the procedure.      Electronically signed by Jose Casas MD on 2/1/2022 at 8:53 AM

## 2022-05-04 ENCOUNTER — HOSPITAL ENCOUNTER (OUTPATIENT)
Dept: RADIATION ONCOLOGY | Age: 82
Discharge: HOME | End: 2022-05-04
Attending: RADIOLOGY
Payer: COMMERCIAL

## 2022-05-04 ENCOUNTER — HOSPITAL ENCOUNTER (OUTPATIENT)
Dept: RADIATION ONCOLOGY | Age: 82
Discharge: HOME | End: 2022-05-04
Payer: COMMERCIAL

## 2022-05-04 VITALS
SYSTOLIC BLOOD PRESSURE: 143 MMHG | RESPIRATION RATE: 16 BRPM | HEART RATE: 94 BPM | OXYGEN SATURATION: 97 % | TEMPERATURE: 97.3 F | BODY MASS INDEX: 30.9 KG/M2 | DIASTOLIC BLOOD PRESSURE: 85 MMHG | WEIGHT: 180 LBS

## 2022-05-04 PROCEDURE — 99214 OFFICE O/P EST MOD 30 MIN: CPT | Performed by: RADIOLOGY

## 2022-05-04 PROCEDURE — 77334 RADIATION TREATMENT AID(S): CPT | Performed by: RADIOLOGY

## 2022-05-04 PROCEDURE — 77290 THER RAD SIMULAJ FIELD CPLX: CPT | Performed by: RADIOLOGY

## 2022-05-04 PROCEDURE — 77263 THER RADIOLOGY TX PLNG CPLX: CPT | Performed by: RADIOLOGY

## 2022-05-04 NOTE — PROGRESS NOTES
NURSING ASSESSMENT     Date: 2022        Patient Name: Guy Petty     YOB: 1940      Age:  80 y.o. MRN: [id number]       Chaperone [] Yes   [x] No      Advance Directives:   Do you currently have completed advance directives (living will)? [] Yes   [x] No         *If yes, please bring us a copy for your records. *If no, would you like info or assistance in completing advance directives (living will)? [] Yes   [x] No    Pain Score:   Pain Score (1-10): none     General: Fatigue, alert and oriented, pt is undecided about radiation    Eyes (Ophthalmic): pt has noticed she needs reading glasses more often since chemo     Skin (Dermatological): Bruising arms and legs since chemo     ENT: No Problems     Respiratory: No Problems     Cardiovascular: Edema after herceptin infusion lasted for a few days then went away, has infusaport left chest      Device   [] Yes   [x] No   Copy of Card Obtained [] Yes   [x] No    Gastrointestinal: No Problems, appetite is good, mostly bread and crackers    Genito-Urinary: No Problems     Breast: Changes, s/p right breast lumpectomy, SLNB     Musculoskeletal: Joint Pain, arthritis in hands    Neurological: Numbness and Tingling right arm on occasion      Hematological and Lymphatic: Easy Bruising     Endocrine: Hair Loss since taxol    Gyn History:   /Para: 3/3  Age of Menarche: 17  Age of Menopause 52  Vaginal Bleeding:  no  First Pregnancy:20  Breast Feeding:  no  Last Menstrual period: 50  Oral Contraceptives: no     Number of years:   Hormone Replacement therapy yes     Number of Years: 6 months  Last Pap Smear: 2019  Last Mammogram 10/11/21    A 10-point review of systems has been conducted and pertinent positives have been   recorded. All other review of systems are negative    Was the patient admitted during the course of treatment OR within 30 days of treatment?  no    Additional Comments: pt completed Taxol 22 and will continue herceptin q3w to complete a year

## 2022-05-04 NOTE — PROGRESS NOTES
Teaching & Instructions - Breast  General  Simulation  Initial Treatment  Self-Care Info  Nutrition  Social Service    Site-Specific  Side Effects  Fatigue Mgmt  Lymphedema Mgmt  Pain Mgmt  Skin Care    Educational Handouts  Radiation Therapy & You  Site Specific Instructions  Radiation Oncology Dept Information  CBMS Program    Patient eager to learn, verbalized understanding of verbal education and handout                                  RADIATION THERAPY BARRIERS ASSESSMENT    During your CT Simulation, you were placed in the position that you be in for your radiation treatments. You will be will in this position for approximately 10-15 minutes, do you have any concerns about staying in this position for the required time? no    While you are on treatment, you will be evaluated by the Radiation Oncologist once a week on Thursdays. The focus of these appointments will be management of side effects. You will be here for a minimum of one hour or more depending on your specific needs. Do you have any concerns about your ability to keep this appointment? no    You will be coming to the [facility] for 20    daily treatments, do you have any concerns about transportation?    no    Do you have any financial concerns about your treatment that you would like to further discuss with social work? no    Do you have any other concerns or for see any concerns or potential barriers to completing your radiation therapy that you want us to be aware of? no

## 2022-05-05 ASSESSMENT — ENCOUNTER SYMPTOMS
GASTROINTESTINAL NEGATIVE: 1
EYES NEGATIVE: 1
RESPIRATORY NEGATIVE: 1
ALLERGIC/IMMUNOLOGIC NEGATIVE: 1

## 2022-05-05 NOTE — PROGRESS NOTES
[facility]           Radiation Oncology      [address] [facility]        [address]        Lacie [place]ne: [phone]        F: [phone]       [place]                   Dr. Susana Daniels MD PhD    FOLLOW-UP NOTE     Date of Service: 2022  Patient ID: Alfonso Moncada   : 1940  MRN: [id number]   Acct Number: [de-identified]       NAME:  Alfonso Moncada    :  1940 80 y.o. female     PCP: Binta Leo MD    REFERRING PROVIDER: Ambrosio Lynch    DIAGNOSIS: Invasive ductal carcinoma the right breast    STAGING: Cancer Staging  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, estrogen receptor negative ([facility] [address]  Staging form: Breast, AJCC 8th Edition  - Clinical stage from 10/12/2021: Stage IA (cT1, cN0, cM0, G3, ER-, CA-, HER2+) - Signed by Mulu Jeong MD on 2021      PRIOR TREATMENT: None    TIME SINCE TREATMENT: Not applicable    RECENT HISTORY: Alfonso Moncada returns for follow up status post completion of adjuvant chemotherapy with Taxol x12 and Trazimera every 3 weeks. She has not yet gone on to receive adjuvant radiation therapy and presents today in follow-up to again discuss the role and rationale of adjuvant radiation therapy for the above diagnosis. She reports significant fatigue and swelling in her legs after completing her weekly Taxol on 2022. She also notes easy bruising. She admits to being emotionally exhausted and has some hesitation about moving forward with adjuvant radiation therapy. She otherwise denies any complaints at this time. Past medical, surgical, social and family histories reviewed and updated as indicated.     ALLERGIES:  Atorvastatin, Clonidine, Fenofibrate micronized, Levofloxacin, Raloxifene, and Statins       MEDICATIONS:   Current Outpatient Medications:     vitamin D 25 MCG (1000 UT) CAPS, Take by mouth daily, Disp: , Rfl:     Trastuzumab-qyyp (TRAZIMERA IV), Infuse intravenously every 21 days, Disp: , Rfl:   hydrocortisone 2.5 % cream, apply sparingly to affected area ON FACE TWICE A DAY (Patient not taking: Reported on 5/4/2022), Disp: , Rfl:     prochlorperazine (COMPAZINE) 10 MG tablet, take 1 tablet by mouth every 6 hours if needed for nausea, Disp: , Rfl:     tolterodine (DETROL LA) 4 MG extended release capsule, , Disp: , Rfl:     ezetimibe (ZETIA) 10 MG tablet, Take 10 mg by mouth daily (Patient not taking: Reported on 5/4/2022), Disp: , Rfl:     calcium citrate-vitamin D (CITRACAL+D) 315-200 MG-UNIT per tablet, Take 1 tablet by mouth (Patient not taking: Reported on 5/4/2022), Disp: , Rfl:     candesartan-hydrochlorothiazide (ATACAND HCT) 32-12.5 MG per tablet, , Disp: , Rfl:     tretinoin (RETIN-A) 0.1 % cream, Apply sparingly at bedtime (Patient not taking: Reported on 5/4/2022), Disp: , Rfl:     Current Facility-Administered Medications:     lidocaine 1 % injection 10 mL, 10 mL, IntraDERmal, Once, Prosper Lr MD    sodium bicarbonate 8.4 % injection 3 mEq, 3 mL, IntraVENous, Once, Prosper Lr MD    Review of Systems   Constitutional: Positive for activity change and fatigue. HENT: Negative. Eyes: Negative. Respiratory: Negative. Cardiovascular: Negative. Gastrointestinal: Negative. Endocrine: Negative. Genitourinary: Negative. Musculoskeletal: Negative. Skin: Negative. Allergic/Immunologic: Negative. Neurological: Negative. Hematological: Negative. Psychiatric/Behavioral: Negative. PHYSICAL EXAMINATION:      Vitals:    05/04/22 1257   BP: (!) 143/85   Pulse: 94   Resp: 16   Temp: 97.3 °F (36.3 °C)   SpO2: 97%   Weight: 180 lb (81.6 kg)       Wt Readings from Last 3 Encounters:   05/04/22 180 lb (81.6 kg)   02/01/22 178 lb (80.7 kg)   01/26/22 185 lb (83.9 kg)       ECOG PERFORMANCE STATUS:  1    Physical Exam  Vitals and nursing note reviewed. Constitutional:       General: She is not in acute distress. Appearance: Normal appearance.  She is not ill-appearing, toxic-appearing or diaphoretic. Comments: Is present unaccompanied. HENT:      Head: Normocephalic and atraumatic. Eyes:      General: No scleral icterus. Extraocular Movements: Extraocular movements intact. Cardiovascular:      Rate and Rhythm: Regular rhythm. Heart sounds: Normal heart sounds. Pulmonary:      Breath sounds: Normal breath sounds. Chest:          Comments: Incisions in the right breast are well-healed. There are otherwise no palpable abnormalities in the right or left breast.   Abdominal:      General: Bowel sounds are normal.      Palpations: Abdomen is soft. Genitourinary:     Comments: Deferred. Musculoskeletal:         General: Normal range of motion. Cervical back: Normal range of motion and neck supple. No rigidity. Lymphadenopathy:      Cervical: No cervical adenopathy. Skin:     General: Skin is warm and dry. Neurological:      General: No focal deficit present. Mental Status: She is alert and oriented to person, place, and time. Psychiatric:         Mood and Affect: Mood normal.         Behavior: Behavior normal.     ASSESSMENT/PLAN:  This is a 51-year-old female with new diagnosis of invasive ductal carcinoma the right breast, ER-/ID-/HER-2/beny amplified by FISH status post lumpectomy and sentinel lymph node biopsy. She went on to receive weekly Taxol x12 with Trazimera every 3 weeks for total of the year. She completed Taxol on 4/12/2022 and will continue Karyn Rodes for a total of a year. She now returns for a follow-up to discuss the role and rationale of adjuvant radiation therapy in the management of her disease. I again reviewed the risks, benefits, and rationale of radiation therapy in the setting and discussed that with her biology she is at high risk of local regional recurrence and thus I am recommending radiation therapy.   She did indicate that she was significantly fatigued from chemotherapy and did have some hesitation with radiation however after considering her options and understanding the high risk of recurrence in the absence of radiation, she is interested in moving forward with radiation therapy. Informed consent was signed today. We will simulate her today with initiation of therapy in a few weeks time. In the interim she knows to remain available should she have any additional questions or concerns.      ORDERS: Simulation for today, 5/4/2022    FOLLOW-UP: Patient will return for treatment during the week of 5/16/2022    Melissa Wagner MD PhD  Radiation Oncologist, [facility]

## 2022-05-09 PROCEDURE — 77295 3-D RADIOTHERAPY PLAN: CPT | Performed by: RADIOLOGY

## 2022-05-09 PROCEDURE — 77300 RADIATION THERAPY DOSE PLAN: CPT | Performed by: RADIOLOGY

## 2022-05-09 PROCEDURE — 77334 RADIATION TREATMENT AID(S): CPT | Performed by: RADIOLOGY

## 2022-05-10 ENCOUNTER — HOSPITAL ENCOUNTER (OUTPATIENT)
Dept: WOMENS IMAGING | Age: 82
Discharge: HOME | End: 2022-05-12
Payer: COMMERCIAL

## 2022-05-10 VITALS — BODY MASS INDEX: 31.89 KG/M2 | HEIGHT: 63 IN

## 2022-05-10 DIAGNOSIS — C50.411 CARCINOMA OF UPPER-OUTER QUADRANT OF RIGHT BREAST IN FEMALE, ESTROGEN RECEPTOR NEGATIVE (HCC): ICD-10-CM

## 2022-05-10 DIAGNOSIS — Z17.1 CARCINOMA OF UPPER-OUTER QUADRANT OF RIGHT BREAST IN FEMALE, ESTROGEN RECEPTOR NEGATIVE (HCC): ICD-10-CM

## 2022-05-10 DIAGNOSIS — N64.9 BREAST DISORDER: ICD-10-CM

## 2022-05-10 PROCEDURE — G0279 TOMOSYNTHESIS, MAMMO: HCPCS

## 2022-05-16 ENCOUNTER — OFFICE VISIT (OUTPATIENT)
Dept: SURGERY | Age: 82
End: 2022-05-16
Payer: COMMERCIAL

## 2022-05-16 VITALS
WEIGHT: 185.8 LBS | BODY MASS INDEX: 32.92 KG/M2 | HEART RATE: 83 BPM | HEIGHT: 63 IN | DIASTOLIC BLOOD PRESSURE: 88 MMHG | TEMPERATURE: 97.5 F | OXYGEN SATURATION: 97 % | SYSTOLIC BLOOD PRESSURE: 190 MMHG | RESPIRATION RATE: 14 BRPM

## 2022-05-16 DIAGNOSIS — N64.9 BREAST DISORDER: ICD-10-CM

## 2022-05-16 DIAGNOSIS — Z17.1 CARCINOMA OF UPPER-OUTER QUADRANT OF RIGHT BREAST IN FEMALE, ESTROGEN RECEPTOR NEGATIVE (HCC): Primary | ICD-10-CM

## 2022-05-16 DIAGNOSIS — C50.411 CARCINOMA OF UPPER-OUTER QUADRANT OF RIGHT BREAST IN FEMALE, ESTROGEN RECEPTOR NEGATIVE (HCC): Primary | ICD-10-CM

## 2022-05-16 DIAGNOSIS — E66.9 OBESITY, CLASS I, BMI 30-34.9: ICD-10-CM

## 2022-05-16 DIAGNOSIS — Z01.419 WELL FEMALE EXAM WITH ROUTINE GYNECOLOGICAL EXAM: ICD-10-CM

## 2022-05-16 DIAGNOSIS — Z80.3 FAMILY HISTORY OF BREAST CANCER IN SISTER: ICD-10-CM

## 2022-05-16 PROCEDURE — 99214 OFFICE O/P EST MOD 30 MIN: CPT | Performed by: SURGERY

## 2022-05-16 RX ORDER — BACLOFEN 5 MG/1
1 TABLET ORAL 3 TIMES DAILY PRN
COMMUNITY
Start: 2022-05-10

## 2022-05-16 NOTE — PROGRESS NOTES
PATIENT:    Kevin Luis     DATE:      5/16/2022     TIME: 10:35 AM     Subjective:     Kevin Luis presents for follow-up of breast cancer. She lost her sister to breast cancer recently    The breast cancer is Cancer Staging  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, estrogen receptor negative ([facility] [address]  Staging form: Breast, AJCC 8th Edition  - Clinical stage from 10/12/2021: Stage IA (cT1, cN0, cM0, G3, ER-, IA-, HER2+) - Signed by Chioma Larose MD on 11/18/2021     Patient's medications, allergies, past medical, surgical, social and family histories were reviewed and updated as appropriate.     Current Outpatient Medications on File Prior to Visit   Medication Sig Dispense Refill    Baclofen (LIORESAL) 5 MG tablet Take 1 tablet by mouth 3 times daily as needed      vitamin D 25 MCG (1000 UT) CAPS Take by mouth daily      Trastuzumab-qyyp (TRAZIMERA IV) Infuse intravenously every 21 days      hydrocortisone 2.5 % cream apply sparingly to affected area ON FACE TWICE A DAY (Patient not taking: Reported on 5/4/2022)      prochlorperazine (COMPAZINE) 10 MG tablet take 1 tablet by mouth every 6 hours if needed for nausea      tolterodine (DETROL LA) 4 MG extended release capsule       ezetimibe (ZETIA) 10 MG tablet Take 10 mg by mouth daily (Patient not taking: Reported on 5/4/2022)      calcium citrate-vitamin D (CITRACAL+D) 315-200 MG-UNIT per tablet Take 1 tablet by mouth (Patient not taking: Reported on 5/4/2022)      candesartan-hydrochlorothiazide (ATACAND HCT) 32-12.5 MG per tablet       tretinoin (RETIN-A) 0.1 % cream Apply sparingly at bedtime (Patient not taking: Reported on 5/4/2022)       Current Facility-Administered Medications on File Prior to Visit   Medication Dose Route Frequency Provider Last Rate Last Admin    lidocaine 1 % injection 10 mL  10 mL IntraDERmal Once Chioma Larose MD        sodium bicarbonate 8.4 % injection 3 mEq  3 mL IntraVENous Once Chioma Larose MD Any over the counter meds / herbal supplements / vitamins: yes      Objective:     Vitals:    05/16/22 1003 05/16/22 1010   BP: (!) 168/87 (!) 190/88   Pulse: 83    Resp: 14    Temp: 97.5 °F (36.4 °C)    SpO2: 97%    Weight: 185 lb 12.8 oz (84.3 kg)    Height: 5' 3\" (1.6 m)         Lymphedema Screening 5/16/2022   RIGHT 10 cm (Hand) 20.5   RIGHT 20 cm (Wrist) 16   RIGHT 30 cm (Forearm) 21   RIGHT 60 cm (Upper Arm) 32.5       Physical Exam  Vitals reviewed. Constitutional:       Appearance: Normal appearance. She is well-developed. HENT:      Head: Normocephalic and atraumatic. Nose: Nose normal.   Eyes:      Conjunctiva/sclera: Conjunctivae normal.      Right eye: No hemorrhage. Left eye: No hemorrhage. Cardiovascular:      Rate and Rhythm: Normal rate. Pulmonary:      Effort: Pulmonary effort is normal. No respiratory distress. Chest:   Breasts: Breasts are symmetrical.      Right: Mass (near breast scar, likely postoperative scarring) present. No inverted nipple, nipple discharge, skin change, tenderness, axillary adenopathy or supraclavicular adenopathy. Left: No inverted nipple, mass, nipple discharge, skin change, tenderness, axillary adenopathy or supraclavicular adenopathy. Musculoskeletal:         General: Normal range of motion. Cervical back: Normal range of motion and neck supple. Comments: Normal Range of motion in upper and lower extremities. B/l LE edema     Lymphadenopathy:      Cervical: No cervical adenopathy. Right cervical: No superficial, deep or posterior cervical adenopathy. Left cervical: No superficial, deep or posterior cervical adenopathy. Upper Body:      Right upper body: No supraclavicular, axillary or pectoral adenopathy. Left upper body: No supraclavicular, axillary or pectoral adenopathy. Skin:     General: Skin is warm and dry. Findings: No abrasion, bruising, erythema or lesion.    Neurological:      Mental Status: She is alert and oriented to person, place, and time. She is not disoriented. Psychiatric:         Speech: Speech normal.         Behavior: Behavior normal. Behavior is cooperative. Thought Content: Thought content normal.         Judgment: Judgment normal.          IMAGING:     ERI BRENDAN DIGITAL DIAGNOSTIC UNILATERAL RIGHT    Result Date: 5/10/2022  EXAMINATION: ERI BRENDAN DIGITAL DIAGNOSTIC UNILATERAL RIGHT CLINICAL HISTORY:N64.9 Breast disorder ICD10 . History of right breast cancer treated with chemotherapy and lumpectomy. COMPARISON: Priors from 2021, 2019, 2018, 2017, 2015. RESULT: 3-D tomosynthesis imaging of the right breast was performed. There are scattered fibroglandular densities. Postsurgical changes within the right breast at approximately the 9:00 position with surgical clips and area of probable scarring from interval lumpectomy. Remainder of the breast appears similar to prior. CAD analysis was performed and used in the interpretation. BI-RADS 3: PROBABLY BENIGN--SHORT INTERVAL FOLLOW-UP SUGGESTED. Interval postsurgical changes with probable scarring. 6 month follow-up is suggested when due for annual bilateral mammogram. DENSITY: Scattered Board Certified Radiologists. Accredited by the ACR and FDA. MAMMOGRAPHY IS VERY IMPORTANT TO YOUR HEALTH. THE AMERICAN CANCER SOCIETY GUIDELINES RECOMMEND THAT WOMEN 36YEARS OF AGE AND OLDER SHOULD HAVE A MAMMOGRAM EVERY YEAR. A REMINDER LETTER WILL BE SENT AT THE APPROPRIATE TIME.  THIS FACILITY UTILIZES A REMINDER SYSTEM TO ENSURE ALL PATIENTS RECEIVE REMINDER NOTIFICATIONS AT THE APPROPRIATE TIME BASED ON THE RECOMMENDATIONS OF THIS EXAM. THIS INCLUDES REMINDERS FOR ROUTINE  SCREENING MAMMOGRAMS, DIAGNOSTIC MAMMOGRAMS IN WHICH THE PATIENT IS ASKED TO RETURN FOR ADDITIONAL VIEWS, OR OTHER BREAST IMAGING INTERVENTIONS WHEN APPROPRIATE.  THE PATIENT WILL BE PLACED IN THE APPROPRIATE REMINDER SYSTEM INCLUDING A REMINDER AT THE APPROPRIATE TIME FOR ANY PENDING ADDITIONAL VIEWS. Assessment:       ICD-10-CM    1. 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, estrogen receptor negative ([facility] [address]  C50.411 ERI BRENDAN DIGITAL DIAGNOSTIC BILATERAL    Z17.1 [address] NP, Oncology Support Services   2. Breast disorder  N64.9 ERI BRENDAN DIGITAL DIAGNOSTIC BILATERAL   3. Well female exam with routine gynecological exam  Z01.419 Mally Sanchez MD, [facility], [address] [place] [address]   4. Family history of breast cancer in sister  Z80.2    11. Obesity, Class I, BMI 30-34.9  E66.9          Plan:     Patient is doing well. Recommend clinical breast exams in the breast surgical suite in 6 month(s)  Mammography in 6 month(s)  Recommend an active lifestyle, healthy diet, limited alcohol intake, achieve and maintain a healthy BMI to optimize breast cancer outcomes / decrease risk of breast cancer. Referral to or Follow up with Medical Oncology  Referral to or Follow up with Radiation Oncology  Follow up with PCP regarding all medical problems    Total face to face time was 30 minutes today with greater than 50% spent on counseling the patient or coordinating her care, reviewing records prior to visit, history, physical exam, reviewing imaging. Discussion regarding natural history and potential progression of breast changes, timing of surveillance visits, timing and type of surveillance imaging, life-style modification, exercise, and limiting alcohol intake were performed today. Dictated by Jacque Mayorga MD 5/16/2022 10:35 AM       This note was partially generated using Dragon voice recognition system, and there may be some incorrect words, spellings, punctuation that were not noticed in checking the note before saving.

## 2022-05-16 NOTE — PROGRESS NOTES
General Information   Patient Name: Xiao Quesada  Patient : 1940    Patient STONEY:[phone]  Email: [email or web address]    Health Care Providers (Including Names, Institution)   Primary Care Provider: Tasha Goldberg MD    Surgeon: Cleopatra Daly MD FACS   Radiation Oncologist: Dr. Antionette Doyle Oncologist: [facility]       Treatment Summary   Diagnosis   Cancer Staging  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, estrogen receptor negative ([facility] [address]  Staging form: Breast, AJCC 8th Edition  - Clinical stage from 10/12/2021: Stage IA (cT1, cN0, cM0, G3, ER-, MA-, HER2+) - Signed by Cleopatra Daly MD on 2021       Diagnosis Year:    Treatment Completed   Surgery: [x] Yes   []No Surgery Date(s) (year): 10/28/2021   Surgical procedure/findings: Right Lumpectomy and SLNB    Lymph node removal: []Axillary Dissection [x] [place] Biopsy   Radiation: Yes starting 2022 Body area treated: Breast End Date (year):   Systemic Therapy (chemotherapy, hormonal therapy, other): Yes, taxol , herceptin  [] Before surgery [x] After surgery   Names of Agents Used End Dates (year)   []5-Fluorouracil     []Carboplatin     [] Cyclophosphamide    [] Docetaxel    [] Doxorubicin    [] Epirubicin     [] Methotrexate    []Paclitaxel     [] Pertuzumab    [x]Trastuzumab     [] Other    Treatment Ongoing   Additional treatment name Planned duration Possible Side effects   [] Tamoxifen 5-10 Years Hot flashes and vaginal discharge (common); endometrial cancer, serious blood clots and eye problems (all very rare). Other rare side effects may occur. [] Aromatase Inhibitors (anastrozole, exemestane and letrozole) 5 Years Hot flashes, joint/muscle aches, vaginal dryness and bone loss (common); hair thinning (rare) Other rare side effects may occur.   [] GnRH agonist (Zoladex, Lupron) for ovarian suppression  Hot flashes and vaginal dryness (common); other rare side effects may occur.    Other: Persistent symptoms or side effects at completion of treatment:  Fatigue: Yes                                                                Menopausal symptoms: No      Numbness:  No                                                          Pain: No      Psychosocial/Depression: No                                    Other (enter type(s)): Familial Cancer Risk Assessment   Breast and or ovarian cancer in 1st or 2nd degree relatives:  Yes     Sister   Received Genetic counseling:  Yes    Genetic testing: Yes Genetic testing results:negative       Follow-up Care Plan   Your follow-up care plan is design to inform you and primary care providers regarding the recommended and required follow-up, cancer screening and routine health maintenance that is needed to maintain optimal health. Possible late- and long-term effects that someone with this type of cancer and treatment may experience:  Weakening of the heart presenting as shortness of breath and swelling of legs (rare < 5%); and bones become weak and at risk for fracture (osteoporosis). It is important to remember that these symptoms can be due to other causes like diabetes or with normal aging. If these or any other new symptoms occur bring these to attention of your health care provider. These symptoms should be brought to the attention of your provider:   1. Anything that represents a brand new symptom;  2. Anything that represents a persistent symptom;  3. Anything you are worried about that might be related to the cancer coming back. Please continue to see your primary care provider for all general health care recommended for a woman your age such as routine immunizations, and routine non-breast cancer screening like colonoscopy or bone density exams. Consult with your health care provider about prevention and screening for bone loss using bone density tests.    Schedule for Clinical Visits   Coordinating Provider When/How often   Keeley January Monico Nageotte, MD FACS Every 6 months for two years / Yearly afterwards   Medical Oncology Every 3-6 months for 5 years, yearly after that   Radiation Oncology 1 week, weekly depending on skin reaction. 4-6 weeks after treatment and then Per radiation oncology note if patient is considered high risk. Cancer Surveillance Or Other Recommended Tests   Coordinating Provider TEST How often   Paulina Gaviria MD  Mammogram Annually   Paulina Gaviria MD   MRI breast As indicated by provider   GYN Pap/pelvic exam As indicated by provider   Lucretia Nichols MD  Colonoscopy As indicated by provider   Medical Oncology Bone Density Every 2 years if on an aromatase inhibitor or as indicated by your provider   Breast cancer survivors may experience issues with the areas listed below. If you have any concerns in these or other areas, please speak with your doctors or nurses to find out how you can get help with them. []Anxiety or depression   []Insurance     []Sexual Functioning  []Emotional and mental health []Memory or concentration loss         []Stopping Smoking   []Fatigue    []Parenting     []Weight changes   []Fertility    []Physical functioning   []Other   []Financial advice or assistance  []School/work       A number of lifestyle/behaviors can affect your ongoing health, including the risk for the cancer coming back or developing another cancer. Discuss these recommendations with your doctor or nurse:  []Alcohol use                [] Physical activity                    [] Other  []Diet                 [] Sun screen use      []Management of my medications              [] Tobacco use/cessation       []Management of my other illnesses  [] Weight management (loss/gain)                              Resources you may be interested in:      www.cancer. net   Other: [facility] Survivorship book given and reviewed extensively  [employer] for [employer];  [employer] [facility] [employer] [facility] Survivorship Programs - Flyer given     Other comments: none   Prepared by:   Korin Manriquez MD FACS                                                           Delivered on: 5/16/2022

## 2022-05-16 NOTE — PROGRESS NOTES
Lymphedema Screening 5/16/2022   RIGHT 10 cm (Hand) 20.5   RIGHT 20 cm (Wrist) 16   RIGHT 30 cm (Forearm) 21   RIGHT 60 cm (Upper Arm) 32.5

## 2022-05-16 NOTE — PATIENT INSTRUCTIONS
Patient Education        Preventing Lymphedema After Treatment for Breast Cancer: Care Instructions  Your Care Instructions     Lymphedema is a buildup of fluid in the soft tissues of the body. It can happen in the arm after breast cancer surgery to remove lymph nodes. If there are few or no lymph nodes, fluid can build up in the arm. It can also happen if the lymph system in an arm has been damaged. Infection, tumors, and scar tissuefrom radiation therapy to the armpit area also can cause fluid to build up. You may be able to avoid lymphedema or keep it under control by following the tips below. Make sure that you take good care of the skin on your arm and hand. Your skin acts as a barrier to keep out bacteria and prevent infection. It is also important not to overuse the muscles in the arm. And don't expose your armto very hot or cold temperatures. Lymphedema can happen soon after breast cancer treatment. Or it may happen many years later. It may affect only part of your arm or hand. In some cases, it affects all of the arm. Make sure to follow these precautions even after you finish treatment. Do not ignore tightness or swelling in or around your arm or hand. You are less likely to have long-term problems if you get these symptomstreated right away. Follow-up care is a key part of your treatment and safety. Be sure to make and go to all appointments, and call your doctor if you are having problems. It's also a good idea to know your test results and keep alist of the medicines you take. How can you care for yourself at home? Skin care     Keep your arm, hand, and armpit clean. Use a mild soap that does not dry out your skin.      Moisturize your skin often.      Take good care of the skin around your fingernails.  Do not bite or cut your cuticles.      Ask your doctor how to handle any cuts, scratches, insect bites, or other injuries you may get.      Use sunscreen and insect repellent outdoors to protect your skin from sunburn and insect bites.      Use an electric razor if you shave your armpit. It's less likely to cut or irritate your skin. Activity     Don't wear clothing or jewelry that is tight on your arm or hand. Your doctor may advise you not to wear a watch or rings on the affected hand.      Wear gloves when you do activities that could hurt the skin on your fingers or hand. Wear them when you garden, do yard work, wash dishes, and clean with chemicals. Use oven mitts when you handle hot food.      Do not have blood drawn from the arm on the side of the lymph node surgery. Do not get injections (shots) or have an IV put in the affected arm.      Do not allow a blood pressure cuff to be placed on that arm. If you are in the hospital, make sure you tell your nurse and other hospital staff about your condition.      Do not expose your arm to very hot or very cold temperatures. For example, don't use hot tubs, saunas, or steam rooms. Don't use a heating pad or cold pack on that arm or shoulder.      Rest your arm often when you do repeated movements, such as vacuum, scrub, or mop.      Try to use your other arm to carry heavy things, such as grocery bags. If you carry a briefcase or a purse, avoid shoulder straps and carry it on your good arm.      Ask your doctor about wearing a compression sleeve and glove (gauntlet). Your doctor may want you to wear these when you exercise or when you fly in an airplane. They can help keep fluid from pooling in your arm and hand. Exercise     Ask your doctor about exercises for your arm and hand. Your doctor may recommend that you see a physical therapist. This person can teach you how to do self-massage to move fluid out of your arm.      Check with your doctor before you start exercises that use the arm. This includes tennis, rowing, or weight lifting. Your doctor can help you find an activity level that's right for you.    When should you call for help?   Call your doctor now or seek immediate medical care if:     You have signs of infection, such as:  ? Increased pain, swelling, warmth, or redness. ? Red streaks leading from the area. ? Pus draining from the area. ? A fever. Watch closely for changes in your health, and be sure to contact your doctor if:     You have new or worse symptoms from lymphedema.      You do not get better as expected. Where can you learn more? Go to [email or web address] org and sign in to your [employer] account. Enter G546 in the [place] box to learn more about \"Preventing Lymphedema After Treatment for Breast Cancer: Care Instructions. \"     If you do not have an account, please click on the \"Sign Up Now\" link. Current as of: September 8, 2021               Content Version: 13.2  © 1003-4754 Healthwise, Incorporated. Care instructions adapted under license by [facility] ([facility]). If you have questions about a medical condition or this instruction, always ask your healthcare professional. [place] [address] any warranty or liability for your use of this information.

## 2022-05-17 ENCOUNTER — HOSPITAL ENCOUNTER (OUTPATIENT)
Dept: RADIATION ONCOLOGY | Age: 82
Discharge: HOME | End: 2022-05-17
Attending: RADIOLOGY
Payer: COMMERCIAL

## 2022-05-17 PROCEDURE — 77334 RADIATION TREATMENT AID(S): CPT | Performed by: RADIOLOGY

## 2022-05-17 PROCEDURE — 77280 THER RAD SIMULAJ FIELD SMPL: CPT | Performed by: RADIOLOGY

## 2022-05-17 PROCEDURE — 77307 TELETHX ISODOSE PLAN CPLX: CPT | Performed by: RADIOLOGY

## 2022-05-18 ENCOUNTER — HOSPITAL ENCOUNTER (OUTPATIENT)
Dept: RADIATION ONCOLOGY | Age: 82
Discharge: HOME | End: 2022-05-18
Attending: RADIOLOGY
Payer: COMMERCIAL

## 2022-05-18 PROCEDURE — 77412 RADIATION TX DELIVERY LVL 3: CPT | Performed by: RADIOLOGY

## 2022-05-19 ENCOUNTER — HOSPITAL ENCOUNTER (OUTPATIENT)
Dept: RADIATION ONCOLOGY | Age: 82
Discharge: HOME | End: 2022-05-19
Payer: COMMERCIAL

## 2022-05-19 ENCOUNTER — HOSPITAL ENCOUNTER (OUTPATIENT)
Dept: RADIATION ONCOLOGY | Age: 82
Discharge: HOME | End: 2022-05-19
Attending: RADIOLOGY
Payer: COMMERCIAL

## 2022-05-19 VITALS
RESPIRATION RATE: 16 BRPM | SYSTOLIC BLOOD PRESSURE: 158 MMHG | WEIGHT: 183.6 LBS | TEMPERATURE: 96.7 F | OXYGEN SATURATION: 96 % | HEART RATE: 84 BPM | BODY MASS INDEX: 32.52 KG/M2 | DIASTOLIC BLOOD PRESSURE: 80 MMHG

## 2022-05-19 PROCEDURE — 77412 RADIATION TX DELIVERY LVL 3: CPT | Performed by: RADIOLOGY

## 2022-05-19 NOTE — PROGRESS NOTES
[facility]           Radiation Oncology      [address] [facility]        [address]        Jo-Ann Finer: [phone]        F: [phone]       [email or web address]                   Dr. Reid Bermeo MD PhD    ON TREATMENT VISIT (OTV) NOTE     Date of Service: 2022  Patient ID: Jenn Nickerson   : 1940  MRN: [id number]   Acct Number: [de-identified]     DIAGNOSIS:  Cancer Staging  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, estrogen receptor negative ([facility] [address]  Staging form: Breast, AJCC 8th Edition  - Clinical stage from 10/12/2021: Stage IA (cT1, cN0, cM0, G3, ER-, MS-, HER2+) - Signed by Deepak Guo MD on 2021      Treatment Area: Breast     Current Total Dose(cGy): 532 cGy  Current Fraction: 2  Final/Cumulative Rx. Dose (cGy):     Patient was seen today for weekly visit. Wt Readings from Last 3 Encounters:   22 183 lb 9.6 oz (83.3 kg)   22 185 lb 12.8 oz (84.3 kg)   22 180 lb (81.6 kg)       BP (!) 158/80   Pulse 84   Temp 96.7 °F (35.9 °C)   Resp 16   Wt 183 lb 9.6 oz (83.3 kg)   SpO2 96%   BMI 32.52 kg/m²     Lab Results   Component Value Date    WBC 7.1 2022     2022       Comfort Alteration  Fatigue:None, able to perform daily activities    Pain Location: Denies  Pain Intensity (Current): 0 No Pain  Pain Treatment: N/A  Pain Relief: n/a  Hot Flashes/Flushes: None    Emotional Alteration:   Coping: effective    Nutritional Alteration  Anorexia: none   Nausea: No nausea noted  Vomiting: No vomiting   Dyspepsia/Heartburn: None    Skin Alteration   Skin reaction: Mild erythema to right breast. Will start using Aquaphor today. Ventilation Alteration  Cough: None  Hemoptysis: None  Dyspnea: Normal  Mucous Quantity/Quality:     Additional Comments:  Will start using Aquaphor today BID    MEDICATIONS:     Current Outpatient Medications   Medication Sig Dispense Refill    Baclofen (LIORESAL) 5 MG tablet Take 1 tablet by mouth 3 times daily as needed      vitamin D 25 MCG (1000 UT) CAPS Take by mouth daily      Trastuzumab-qyyp (TRAZIMERA IV) Infuse intravenously every 21 days (Patient not taking: Reported on 2022)      hydrocortisone 2.5 % cream apply sparingly to affected area ON FACE TWICE A DAY (Patient not taking: Reported on 2022)      prochlorperazine (COMPAZINE) 10 MG tablet take 1 tablet by mouth every 6 hours if needed for nausea      tolterodine (DETROL LA) 4 MG extended release capsule       ezetimibe (ZETIA) 10 MG tablet Take 10 mg by mouth daily (Patient not taking: Reported on 2022)      calcium citrate-vitamin D (CITRACAL+D) 315-200 MG-UNIT per tablet Take 1 tablet by mouth (Patient not taking: Reported on 2022)      candesartan-hydrochlorothiazide (ATACAND HCT) 32-12.5 MG per tablet       tretinoin (RETIN-A) 0.1 % cream Apply sparingly at bedtime (Patient not taking: Reported on 2022)       Current Facility-Administered Medications   Medication Dose Route Frequency Provider Last Rate Last Admin    lidocaine 1 % injection 10 mL  10 mL IntraDERmal Once Gerhardt Hose, MD        sodium bicarbonate 8.4 % injection 3 mEq  3 mL IntraVENous Once Gerhardt Hose, MD         * New    PHYSICAL EXAM:       ECO - Symptomatic but completely ambulatory (Restricted in physically strenuous activity but ambulatory and able to carry out work of a light or sedentary nature. For example, light housework, office work)    General: NAD, AO x 3, Mentation is clear with appropriate affect. HEENT: Normocephalic, atraumatic  Thorax:  Unlabored  Abdomen:  Non-distended    Chemotherapy Update: None    Treatment Imaging: Kv Pair    ASSESSMENT: No significant radiation side effects. Responding appropriately to symptomatic management. New medications, diagnostic results: Continue treatment as planned    PLAN: Again reviewed potential side effects of radiation for the patient's treatment.     Continue local/topical care. Reinforced twice daily aquaphor to the skin of the right breast as the patient had not yet been doing this. Continue current radiation course as prescribed.

## 2022-05-20 ENCOUNTER — HOSPITAL ENCOUNTER (OUTPATIENT)
Dept: RADIATION ONCOLOGY | Age: 82
Discharge: HOME | End: 2022-05-20
Attending: RADIOLOGY
Payer: COMMERCIAL

## 2022-05-20 PROCEDURE — 77412 RADIATION TX DELIVERY LVL 3: CPT | Performed by: RADIOLOGY

## 2022-05-23 ENCOUNTER — HOSPITAL ENCOUNTER (OUTPATIENT)
Dept: NON INVASIVE DIAGNOSTICS | Age: 82
Discharge: HOME | End: 2022-05-23
Payer: COMMERCIAL

## 2022-05-23 ENCOUNTER — HOSPITAL ENCOUNTER (OUTPATIENT)
Dept: RADIATION ONCOLOGY | Age: 82
Discharge: HOME | End: 2022-05-23
Payer: COMMERCIAL

## 2022-05-23 DIAGNOSIS — T45.1X5S: ICD-10-CM

## 2022-05-23 LAB
LV EF: 50 %
LVEF MODALITY: NORMAL

## 2022-05-23 PROCEDURE — 93306 TTE W/DOPPLER COMPLETE: CPT

## 2022-05-23 PROCEDURE — G6002 STEREOSCOPIC X-RAY GUIDANCE: HCPCS | Performed by: RADIOLOGY

## 2022-05-23 PROCEDURE — 77427 RADIATION TX MANAGEMENT X5: CPT | Performed by: RADIOLOGY

## 2022-05-23 PROCEDURE — 77412 RADIATION TX DELIVERY LVL 3: CPT | Performed by: RADIOLOGY

## 2022-05-23 PROCEDURE — 93356 MYOCRD STRAIN IMG SPCKL TRCK: CPT

## 2022-05-23 PROCEDURE — 77387 GUIDANCE FOR RADJ TX DLVR: CPT | Performed by: RADIOLOGY

## 2022-05-24 ENCOUNTER — HOSPITAL ENCOUNTER (OUTPATIENT)
Dept: RADIATION ONCOLOGY | Age: 82
Discharge: HOME | End: 2022-05-24
Payer: COMMERCIAL

## 2022-05-24 PROCEDURE — 77417 THER RADIOLOGY PORT IMAGE(S): CPT | Performed by: RADIOLOGY

## 2022-05-24 PROCEDURE — G6002 STEREOSCOPIC X-RAY GUIDANCE: HCPCS | Performed by: RADIOLOGY

## 2022-05-24 PROCEDURE — 77336 RADIATION PHYSICS CONSULT: CPT | Performed by: RADIOLOGY

## 2022-05-24 PROCEDURE — 77412 RADIATION TX DELIVERY LVL 3: CPT | Performed by: RADIOLOGY

## 2022-05-25 ENCOUNTER — HOSPITAL ENCOUNTER (OUTPATIENT)
Dept: RADIATION ONCOLOGY | Age: 82
Discharge: HOME | End: 2022-05-25
Payer: COMMERCIAL

## 2022-05-25 PROCEDURE — 77412 RADIATION TX DELIVERY LVL 3: CPT | Performed by: RADIOLOGY

## 2022-05-25 PROCEDURE — G6002 STEREOSCOPIC X-RAY GUIDANCE: HCPCS | Performed by: RADIOLOGY

## 2022-05-25 PROCEDURE — 77387 GUIDANCE FOR RADJ TX DLVR: CPT | Performed by: RADIOLOGY

## 2022-05-26 ENCOUNTER — HOSPITAL ENCOUNTER (OUTPATIENT)
Dept: RADIATION ONCOLOGY | Age: 82
Discharge: HOME | End: 2022-05-26
Payer: COMMERCIAL

## 2022-05-26 VITALS
WEIGHT: 183 LBS | OXYGEN SATURATION: 96 % | SYSTOLIC BLOOD PRESSURE: 147 MMHG | RESPIRATION RATE: 16 BRPM | TEMPERATURE: 96.5 F | HEART RATE: 80 BPM | DIASTOLIC BLOOD PRESSURE: 74 MMHG | BODY MASS INDEX: 32.42 KG/M2

## 2022-05-26 PROCEDURE — G6002 STEREOSCOPIC X-RAY GUIDANCE: HCPCS | Performed by: RADIOLOGY

## 2022-05-26 PROCEDURE — 77412 RADIATION TX DELIVERY LVL 3: CPT | Performed by: RADIOLOGY

## 2022-05-26 PROCEDURE — 77417 THER RADIOLOGY PORT IMAGE(S): CPT | Performed by: RADIOLOGY

## 2022-05-26 PROCEDURE — 77387 GUIDANCE FOR RADJ TX DLVR: CPT | Performed by: RADIOLOGY

## 2022-05-26 NOTE — PROGRESS NOTES
17 [facility]           Radiation Oncology      [address] [facility]        [address]        Tez Deems: [phone]        F: [phone]       [email or web address]                   Dr. Gama Alvarez MD PhD    ON TREATMENT VISIT (OTV) NOTE     Date of Service: 2022  Patient ID: Janeth Byrd   : 1940  MRN: [id number]   Acct Number: [de-identified]     DIAGNOSIS:  Cancer Staging  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, estrogen receptor negative ([facility] [address]  Staging form: Breast, AJCC 8th Edition  - Clinical stage from 10/12/2021: Stage IA (cT1, cN0, cM0, G3, ER-, NH-, HER2+) - Signed by Rudi Mercedes MD on 2021      Treatment Area: Breast     Current Total Dose(cGy): 3914  Current Fraction: 7    Patient was seen today for weekly visit. Wt Readings from Last 3 Encounters:   22 183 lb (83 kg)   22 183 lb 9.6 oz (83.3 kg)   22 185 lb 12.8 oz (84.3 kg)       BP (!) 147/74   Pulse 80   Temp (!) 96.5 °F (35.8 °C)   Resp 16   Wt 183 lb (83 kg)   SpO2 96%   BMI 32.42 kg/m²     Lab Results   Component Value Date    WBC 7.1 2022     2022       Comfort Alteration  Fatigue:Able to perform daily activities with rest periods    Pain Location: none  Pain Intensity (Current): 0 No Pain  Pain Treatment: No treatment scheduled  Pain Relief: n/a  Hot Flashes/Flushes: None    Emotional Alteration:   Coping: effective    Nutritional Alteration  Anorexia: none   Nausea: No nausea noted  Vomiting: No vomiting   Dyspepsia/Heartburn: None    Skin Alteration   Skin reaction: Faint or dull erythema; follicular reaction very faint pink, pt is using aquaphor twice a day    Ventilation Alteration  Cough: None  Hemoptysis: None  Dyspnea: Normal  Mucous Quantity/Quality: . Additional Comments: Yesenia Rickey     MEDICATIONS:     Current Outpatient Medications   Medication Sig Dispense Refill    Baclofen (LIORESAL) 5 MG tablet Take 1 tablet by mouth 3 times daily as needed (Patient not taking: Reported on 2022)      vitamin D 25 MCG (1000 UT) CAPS Take by mouth daily      Trastuzumab-qyyp (TRAZIMERA IV) Infuse intravenously every 21 days       hydrocortisone 2.5 % cream apply sparingly to affected area ON FACE TWICE A DAY (Patient not taking: Reported on 2022)      prochlorperazine (COMPAZINE) 10 MG tablet take 1 tablet by mouth every 6 hours if needed for nausea      tolterodine (DETROL LA) 4 MG extended release capsule  (Patient not taking: Reported on 2022)      ezetimibe (ZETIA) 10 MG tablet Take 10 mg by mouth daily (Patient not taking: Reported on 2022)      calcium citrate-vitamin D (CITRACAL+D) 315-200 MG-UNIT per tablet Take 1 tablet by mouth (Patient not taking: Reported on 2022)      candesartan-hydrochlorothiazide (ATACAND HCT) 32-12.5 MG per tablet       tretinoin (RETIN-A) 0.1 % cream Apply sparingly at bedtime (Patient not taking: Reported on 2022)       Current Facility-Administered Medications   Medication Dose Route Frequency Provider Last Rate Last Admin    lidocaine 1 % injection 10 mL  10 mL IntraDERmal Once Tatiana Fontana MD        sodium bicarbonate 8.4 % injection 3 mEq  3 mL IntraVENous Once Tatiana Fontana MD         * New    PHYSICAL EXAM:       ECO - Symptomatic but completely ambulatory (Restricted in physically strenuous activity but ambulatory and able to carry out work of a light or sedentary nature. For example, light housework, office work)    General: NAD, AO x 3, Mentation is clear with appropriate affect. HEENT: Normocephalic, atraumatic  Thorax:  Unlabored  Breasts:  Grade I dermatitis of the skin of the right breast, no desquamation  Abdomen:  Non-distended    Chemotherapy Update: Concurrent chemotherapy    Treatment Imaging: [facility]v Pair    ASSESSMENT: Minimal radiation side effects. Responding appropriately to symptomatic management.     New medications, diagnostic results: Continue treatment as planned    PLAN: Again reviewed potential side effects of radiation for the patient's treatment. Continue local/topical care. Continue current radiation course as prescribed.

## 2022-05-27 ENCOUNTER — HOSPITAL ENCOUNTER (OUTPATIENT)
Dept: RADIATION ONCOLOGY | Age: 82
Discharge: HOME | End: 2022-05-27
Payer: COMMERCIAL

## 2022-05-27 PROCEDURE — G6002 STEREOSCOPIC X-RAY GUIDANCE: HCPCS | Performed by: RADIOLOGY

## 2022-05-27 PROCEDURE — 77387 GUIDANCE FOR RADJ TX DLVR: CPT | Performed by: RADIOLOGY

## 2022-05-27 PROCEDURE — 77412 RADIATION TX DELIVERY LVL 3: CPT | Performed by: RADIOLOGY

## 2022-05-31 ENCOUNTER — HOSPITAL ENCOUNTER (OUTPATIENT)
Dept: RADIATION ONCOLOGY | Age: 82
Discharge: HOME | End: 2022-05-31
Payer: COMMERCIAL

## 2022-05-31 PROCEDURE — 77412 RADIATION TX DELIVERY LVL 3: CPT | Performed by: RADIOLOGY

## 2022-05-31 PROCEDURE — 77427 RADIATION TX MANAGEMENT X5: CPT | Performed by: RADIOLOGY

## 2022-05-31 PROCEDURE — G6002 STEREOSCOPIC X-RAY GUIDANCE: HCPCS | Performed by: RADIOLOGY

## 2022-05-31 PROCEDURE — 77387 GUIDANCE FOR RADJ TX DLVR: CPT | Performed by: RADIOLOGY

## 2022-06-01 ENCOUNTER — HOSPITAL ENCOUNTER (OUTPATIENT)
Dept: RADIATION ONCOLOGY | Age: 82
Discharge: HOME | End: 2022-06-01
Payer: COMMERCIAL

## 2022-06-01 PROCEDURE — 77417 THER RADIOLOGY PORT IMAGE(S): CPT | Performed by: RADIOLOGY

## 2022-06-01 PROCEDURE — G6002 STEREOSCOPIC X-RAY GUIDANCE: HCPCS | Performed by: RADIOLOGY

## 2022-06-01 PROCEDURE — 77412 RADIATION TX DELIVERY LVL 3: CPT | Performed by: RADIOLOGY

## 2022-06-01 PROCEDURE — 77387 GUIDANCE FOR RADJ TX DLVR: CPT | Performed by: RADIOLOGY

## 2022-06-01 PROCEDURE — 77336 RADIATION PHYSICS CONSULT: CPT | Performed by: RADIOLOGY

## 2022-06-02 ENCOUNTER — HOSPITAL ENCOUNTER (OUTPATIENT)
Dept: RADIATION ONCOLOGY | Age: 82
Discharge: HOME | End: 2022-06-02
Attending: RADIOLOGY
Payer: COMMERCIAL

## 2022-06-02 ENCOUNTER — HOSPITAL ENCOUNTER (OUTPATIENT)
Dept: RADIATION ONCOLOGY | Age: 82
Discharge: HOME | End: 2022-06-02
Payer: COMMERCIAL

## 2022-06-02 VITALS
BODY MASS INDEX: 32.52 KG/M2 | RESPIRATION RATE: 16 BRPM | DIASTOLIC BLOOD PRESSURE: 93 MMHG | WEIGHT: 183.6 LBS | OXYGEN SATURATION: 98 % | HEART RATE: 80 BPM | TEMPERATURE: 97.7 F | SYSTOLIC BLOOD PRESSURE: 147 MMHG

## 2022-06-02 PROCEDURE — 77387 GUIDANCE FOR RADJ TX DLVR: CPT | Performed by: RADIOLOGY

## 2022-06-02 PROCEDURE — 77412 RADIATION TX DELIVERY LVL 3: CPT | Performed by: RADIOLOGY

## 2022-06-02 PROCEDURE — G6002 STEREOSCOPIC X-RAY GUIDANCE: HCPCS | Performed by: RADIOLOGY

## 2022-06-02 NOTE — PROGRESS NOTES
17 [facility]           Radiation Oncology      [address] [facility]        [address]        [place]: [phone]        F: [phone]       [employer]                   Dr. Sai Humphrey MD PhD    ON TREATMENT VISIT (OTV) NOTE     Date of Service: 2022  Patient ID: Elizabeth De Los Santos   : 1940  MRN: [id number]   Acct Number: [de-identified]     DIAGNOSIS:  Cancer Staging  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, estrogen receptor negative ([facility] [address]  Staging form: Breast, AJCC 8th Edition  - Clinical stage from 10/12/2021: Stage IA (cT1, cN0, cM0, G3, ER-, AL-, HER2+) - Signed by Salena Martinez MD on 2021      Treatment Area: Breast     Current Total Dose(cGy): 2926  Current Fraction: 11    Patient was seen today for weekly visit. Wt Readings from Last 3 Encounters:   22 183 lb 9.6 oz (83.3 kg)   22 183 lb (83 kg)   22 183 lb 9.6 oz (83.3 kg)       BP (!) 147/93   Pulse 80   Temp 97.7 °F (36.5 °C)   Resp 16   Wt 183 lb 9.6 oz (83.3 kg)   SpO2 98%   BMI 32.52 kg/m²     Lab Results   Component Value Date    WBC 7.1 2022     2022       Comfort Alteration  Fatigue:Able to perform daily activities with rest periods    Pain Location: none  Pain Intensity (Current): 0 No Pain  Pain Treatment: No treatment scheduled  Pain Relief: n/a  Hot Flashes/Flushes: None    Emotional Alteration:   Coping: effective    Nutritional Alteration  Anorexia: none   Nausea: No nausea noted  Vomiting: No vomiting   Dyspepsia/Heartburn: None    Skin Alteration   Skin reaction: Faint or dull erythema; follicular reaction, denies itching, using aquaphor twice a day    Ventilation Alteration  Cough: None  Hemoptysis: None  Dyspnea: Normal  Mucous Quantity/Quality: .     Additional Comments:     MEDICATIONS:     Current Outpatient Medications   Medication Sig Dispense Refill    Baclofen (LIORESAL) 5 MG tablet Take 1 tablet by mouth 3 times daily as needed (Patient not taking: Reported on 2022)      vitamin D 25 MCG (1000 UT) CAPS Take by mouth daily      Trastuzumab-qyyp (TRAZIMERA IV) Infuse intravenously every 21 days       hydrocortisone 2.5 % cream apply sparingly to affected area ON FACE TWICE A DAY (Patient not taking: Reported on 2022)      prochlorperazine (COMPAZINE) 10 MG tablet take 1 tablet by mouth every 6 hours if needed for nausea (Patient not taking: Reported on 2022)      tolterodine (DETROL LA) 4 MG extended release capsule  (Patient not taking: Reported on 2022)      ezetimibe (ZETIA) 10 MG tablet Take 10 mg by mouth daily (Patient not taking: Reported on 2022)      calcium citrate-vitamin D (CITRACAL+D) 315-200 MG-UNIT per tablet Take 1 tablet by mouth (Patient not taking: Reported on 2022)      candesartan-hydrochlorothiazide (ATACAND HCT) 32-12.5 MG per tablet       tretinoin (RETIN-A) 0.1 % cream Apply sparingly at bedtime (Patient not taking: Reported on 2022)       Current Facility-Administered Medications   Medication Dose Route Frequency Provider Last Rate Last Admin    lidocaine 1 % injection 10 mL  10 mL IntraDERmal Once Nely Cross MD        sodium bicarbonate 8.4 % injection 3 mEq  3 mL IntraVENous Once Nely Cross MD         * New    PHYSICAL EXAM:       ECO - Symptomatic but completely ambulatory (Restricted in physically strenuous activity but ambulatory and able to carry out work of a light or sedentary nature. For example, light housework, office work)    General: NAD, AO x 3, Mentation is clear with appropriate affect. HEENT: Normocephalic, atraumatic  Thorax:  Unlabored  Breasts:  Treated breast, normal appearance, no significant skin changes in treatment area  Abdomen:  Non-distended    Chemotherapy Update: None    Treatment Imaging: [facility]v Pair    ASSESSMENT: Minimal radiation side effects. Responding appropriately to symptomatic management.     New medications, diagnostic results: Continue treatment as planned    PLAN: Again reviewed potential side effects of radiation for the patient's treatment. Continue local/topical care. Continue current radiation course as prescribed.

## 2022-06-03 ENCOUNTER — HOSPITAL ENCOUNTER (OUTPATIENT)
Dept: RADIATION ONCOLOGY | Age: 82
Discharge: HOME | End: 2022-06-03
Payer: COMMERCIAL

## 2022-06-03 PROCEDURE — G6002 STEREOSCOPIC X-RAY GUIDANCE: HCPCS | Performed by: RADIOLOGY

## 2022-06-03 PROCEDURE — 77412 RADIATION TX DELIVERY LVL 3: CPT | Performed by: RADIOLOGY

## 2022-06-03 PROCEDURE — 77387 GUIDANCE FOR RADJ TX DLVR: CPT | Performed by: RADIOLOGY

## 2022-06-06 ENCOUNTER — HOSPITAL ENCOUNTER (OUTPATIENT)
Dept: RADIATION ONCOLOGY | Age: 82
Discharge: HOME | End: 2022-06-06
Payer: COMMERCIAL

## 2022-06-06 PROCEDURE — G6002 STEREOSCOPIC X-RAY GUIDANCE: HCPCS | Performed by: RADIOLOGY

## 2022-06-06 PROCEDURE — 77387 GUIDANCE FOR RADJ TX DLVR: CPT | Performed by: RADIOLOGY

## 2022-06-06 PROCEDURE — 77412 RADIATION TX DELIVERY LVL 3: CPT | Performed by: RADIOLOGY

## 2022-06-07 ENCOUNTER — HOSPITAL ENCOUNTER (OUTPATIENT)
Dept: RADIATION ONCOLOGY | Age: 82
Discharge: HOME | End: 2022-06-07
Payer: COMMERCIAL

## 2022-06-07 PROCEDURE — G6002 STEREOSCOPIC X-RAY GUIDANCE: HCPCS | Performed by: RADIOLOGY

## 2022-06-07 PROCEDURE — 77412 RADIATION TX DELIVERY LVL 3: CPT | Performed by: RADIOLOGY

## 2022-06-07 PROCEDURE — 77427 RADIATION TX MANAGEMENT X5: CPT | Performed by: RADIOLOGY

## 2022-06-07 PROCEDURE — 77387 GUIDANCE FOR RADJ TX DLVR: CPT | Performed by: RADIOLOGY

## 2022-06-08 ENCOUNTER — HOSPITAL ENCOUNTER (OUTPATIENT)
Dept: RADIATION ONCOLOGY | Age: 82
Discharge: HOME | End: 2022-06-08
Payer: COMMERCIAL

## 2022-06-08 PROCEDURE — 77336 RADIATION PHYSICS CONSULT: CPT | Performed by: RADIOLOGY

## 2022-06-08 PROCEDURE — G6002 STEREOSCOPIC X-RAY GUIDANCE: HCPCS | Performed by: RADIOLOGY

## 2022-06-08 PROCEDURE — 77412 RADIATION TX DELIVERY LVL 3: CPT | Performed by: RADIOLOGY

## 2022-06-08 PROCEDURE — 77387 GUIDANCE FOR RADJ TX DLVR: CPT | Performed by: RADIOLOGY

## 2022-06-09 ENCOUNTER — HOSPITAL ENCOUNTER (OUTPATIENT)
Dept: RADIATION ONCOLOGY | Age: 82
Discharge: HOME | End: 2022-06-09
Payer: COMMERCIAL

## 2022-06-09 VITALS
BODY MASS INDEX: 32.43 KG/M2 | TEMPERATURE: 96.8 F | RESPIRATION RATE: 16 BRPM | WEIGHT: 183 LBS | DIASTOLIC BLOOD PRESSURE: 84 MMHG | HEIGHT: 63 IN | HEART RATE: 80 BPM | SYSTOLIC BLOOD PRESSURE: 137 MMHG | OXYGEN SATURATION: 96 %

## 2022-06-09 PROCEDURE — 77412 RADIATION TX DELIVERY LVL 3: CPT | Performed by: RADIOLOGY

## 2022-06-09 PROCEDURE — 77387 GUIDANCE FOR RADJ TX DLVR: CPT | Performed by: RADIOLOGY

## 2022-06-09 PROCEDURE — G6002 STEREOSCOPIC X-RAY GUIDANCE: HCPCS | Performed by: RADIOLOGY

## 2022-06-09 RX ORDER — MOMETASONE FUROATE 1 MG/G
CREAM TOPICAL
Qty: 50 G | Refills: 1 | Status: SHIPPED | OUTPATIENT
Start: 2022-06-09 | End: 2022-08-02 | Stop reason: ALTCHOICE

## 2022-06-09 NOTE — PROGRESS NOTES
17 [facility]           Radiation Oncology      [address] [facility], [address]        Chucho Porch: [phone]        F: [phone]       [email or web address]                   Dr. Alma Delia Ritchie MD PhD    ON TREATMENT VISIT (OTV) NOTE     Date of Service: 2022  Patient ID: Babatunde Ornelas   : 1940  MRN: [id number]   Acct Number: [de-identified]     DIAGNOSIS:  Cancer Staging  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, estrogen receptor negative ([facility] [address]  Staging form: Breast, AJCC 8th Edition  - Clinical stage from 10/12/2021: Stage IA (cT1, cN0, cM0, G3, ER-, ME-, HER2+) - Signed by Ravi Moody MD on 2021      Treatment Area: Breast     Current Total Dose(cGy): 4256 cGy  Current Fraction: 16    Patient was seen today for weekly visit. Wt Readings from Last 3 Encounters:   22 183 lb (83 kg)   22 183 lb 9.6 oz (83.3 kg)   22 183 lb (83 kg)       /84   Pulse 80   Temp 96.8 °F (36 °C)   Resp 16   Ht 5' 3\" (1.6 m)   Wt 183 lb (83 kg)   SpO2 96%   BMI 32.42 kg/m²     Lab Results   Component Value Date    WBC 7.1 2022     2022       Comfort Alteration  Fatigue:Able to perform daily activities with rest periods    Pain Location: none  Pain Intensity (Current): 0 No Pain  Pain Treatment: No treatment scheduled  Pain Relief: n/a  Hot Flashes/Flushes: None    Emotional Alteration:   Coping: effective    Nutritional Alteration  Anorexia: none   Nausea: No nausea noted  Vomiting: No vomiting   Dyspepsia/Heartburn: None    Skin Alteration   Skin reaction: Bright erythema with follicular response, woke up scratching her chest today    Ventilation Alteration  Cough: None  Hemoptysis: None  Dyspnea: Normal  Mucous Quantity/Quality: . Additional Comments: Adrienne Graven     MEDICATIONS:     Current Outpatient Medications   Medication Sig Dispense Refill    Baclofen (LIORESAL) 5 MG tablet Take 1 tablet by mouth 3 times daily as needed (Patient not taking: Reported on 2022)      vitamin D 25 MCG (1000 UT) CAPS Take by mouth daily      Trastuzumab-qyyp (TRAZIMERA IV) Infuse intravenously every 21 days       hydrocortisone 2.5 % cream apply sparingly to affected area ON FACE TWICE A DAY (Patient not taking: Reported on 2022)      prochlorperazine (COMPAZINE) 10 MG tablet take 1 tablet by mouth every 6 hours if needed for nausea      tolterodine (DETROL LA) 4 MG extended release capsule  (Patient not taking: Reported on 2022)      ezetimibe (ZETIA) 10 MG tablet Take 10 mg by mouth daily       calcium citrate-vitamin D (CITRACAL+D) 315-200 MG-UNIT per tablet Take 1 tablet by mouth (Patient not taking: Reported on 2022)      candesartan-hydrochlorothiazide (ATACAND HCT) 32-12.5 MG per tablet       tretinoin (RETIN-A) 0.1 % cream Apply sparingly at bedtime (Patient not taking: Reported on 2022)       Current Facility-Administered Medications   Medication Dose Route Frequency Provider Last Rate Last Admin    lidocaine 1 % injection 10 mL  10 mL IntraDERmal Once Era Weaver MD        sodium bicarbonate 8.4 % injection 3 mEq  3 mL IntraVENous Once Era Weaver MD         * New    PHYSICAL EXAM:       ECO - Symptomatic but completely ambulatory (Restricted in physically strenuous activity but ambulatory and able to carry out work of a light or sedentary nature. For example, light housework, office work)    General: NAD, AO x 3, Mentation is clear with appropriate affect. HEENT: Normocephalic, atraumatic  Thorax:  Unlabored  Breasts:  Grade II dermatitis of the skin of the right breast, no desquamation  Abdomen:  Non-distended    Chemotherapy Update: None    Treatment Imaging: Kv Pair    ASSESSMENT: Modest radiation side effects. Responding appropriately to symptomatic management.     New medications, diagnostic results: Continue treatment as planned    PLAN: Again reviewed potential side effects of radiation for the patient's treatment. Continue local/topical care. Will add mometasone to her skin care regimen, to be applied twice daily with aquaphor. Continue current radiation course as prescribed.

## 2022-06-10 ENCOUNTER — HOSPITAL ENCOUNTER (OUTPATIENT)
Dept: RADIATION ONCOLOGY | Age: 82
Discharge: HOME | End: 2022-06-10
Payer: COMMERCIAL

## 2022-06-10 PROCEDURE — 77412 RADIATION TX DELIVERY LVL 3: CPT | Performed by: RADIOLOGY

## 2022-06-10 PROCEDURE — 77387 GUIDANCE FOR RADJ TX DLVR: CPT | Performed by: RADIOLOGY

## 2022-06-10 PROCEDURE — 77014 PR CT GUIDANCE PLACEMENT RAD THERAPY FIELDS: CPT | Performed by: RADIOLOGY

## 2022-06-13 ENCOUNTER — HOSPITAL ENCOUNTER (OUTPATIENT)
Dept: RADIATION ONCOLOGY | Age: 82
Discharge: HOME | End: 2022-06-13
Payer: COMMERCIAL

## 2022-06-13 PROCEDURE — 77014 PR CT GUIDANCE PLACEMENT RAD THERAPY FIELDS: CPT | Performed by: RADIOLOGY

## 2022-06-13 PROCEDURE — 77412 RADIATION TX DELIVERY LVL 3: CPT | Performed by: RADIOLOGY

## 2022-06-13 PROCEDURE — 77387 GUIDANCE FOR RADJ TX DLVR: CPT | Performed by: RADIOLOGY

## 2022-06-14 ENCOUNTER — HOSPITAL ENCOUNTER (OUTPATIENT)
Dept: RADIATION ONCOLOGY | Age: 82
Discharge: HOME | End: 2022-06-14
Payer: COMMERCIAL

## 2022-06-14 PROCEDURE — 77427 RADIATION TX MANAGEMENT X5: CPT | Performed by: RADIOLOGY

## 2022-06-14 PROCEDURE — 77014 PR CT GUIDANCE PLACEMENT RAD THERAPY FIELDS: CPT | Performed by: RADIOLOGY

## 2022-06-14 PROCEDURE — 77412 RADIATION TX DELIVERY LVL 3: CPT | Performed by: RADIOLOGY

## 2022-06-14 PROCEDURE — 77387 GUIDANCE FOR RADJ TX DLVR: CPT | Performed by: RADIOLOGY

## 2022-06-14 NOTE — PROGRESS NOTES
17 [facility]           Radiation Oncology      2016 [facility]        [address]        Brigitte Evens: [phone]        F: [phone]       [place]                   Dr. Karen Benavides MD PhD    RADIATION TREATMENT SUMMARY NOTE     Date of Service: 6/15/2022  Patient ID: Lady Fraga   : 1940  MRN: [id number]   Acct Number: [de-identified]       Patient Name:  Lady Fraga,  1940,  80 y.o., female       Referring Physician: Abbi Parson MD  [address] [place] [address]      PCP: Ge Brewer MD       Diagnosis:  Stage 1A, Invasive ductal carcinoma of the right breast, Grade 3, ER-, DE-, Her2+       Recent History: This is a 27-year-old female with new diagnosis of invasive ductal carcinoma the right breast, ER-/DE-/HER-2/beny amplified by FISH status post lumpectomy and sentinel lymph node biopsy. She went on to receive weekly Taxol x12 with Trazimera every 3 weeks for total of the year. She completed Taxol on 2022 and will continue Delphina Donald for a total of a year. She went on to complete adjuvant radiation therapy to the whole right breast with a boost to the lumpectomy cavity per below:    Site Start TX Last  [place] 153 ED Fractions Dose Fx Dose Technique   Right breast 22 22 16/16 4256 cGy 266 cGy Tangents   Right breast CD 6/10/22 6/15/22 5 4/ 1000 cGy 250 cGy 5 Field       Response/Tolerance: She tolerated therapy well with expected fatigue, dermatitis of the skin of the right breast with mild dry desquamation, and mild breast discomfort/pain. She did not require any treatment breaks. Follow-up: I will see her again for formal follow-up in 2 to 3 months after repeat mammography.       Karen Benavides MD PhD  Radiation Oncologist, [address] [place] [address]

## 2022-06-15 ENCOUNTER — HOSPITAL ENCOUNTER (OUTPATIENT)
Dept: RADIATION ONCOLOGY | Age: 82
Discharge: HOME | End: 2022-06-15
Payer: COMMERCIAL

## 2022-06-15 PROCEDURE — 77387 GUIDANCE FOR RADJ TX DLVR: CPT | Performed by: RADIOLOGY

## 2022-06-15 PROCEDURE — 77412 RADIATION TX DELIVERY LVL 3: CPT | Performed by: RADIOLOGY

## 2022-06-15 PROCEDURE — 77336 RADIATION PHYSICS CONSULT: CPT | Performed by: RADIOLOGY

## 2022-06-15 PROCEDURE — 77014 PR CT GUIDANCE PLACEMENT RAD THERAPY FIELDS: CPT | Performed by: RADIOLOGY

## 2022-07-02 ENCOUNTER — APPOINTMENT (OUTPATIENT)
Dept: GENERAL RADIOLOGY | Age: 82
End: 2022-07-02
Payer: COMMERCIAL

## 2022-07-02 ENCOUNTER — HOSPITAL ENCOUNTER (EMERGENCY)
Age: 82
Discharge: HOME | End: 2022-07-02
Attending: EMERGENCY MEDICINE
Payer: COMMERCIAL

## 2022-07-02 ENCOUNTER — APPOINTMENT (OUTPATIENT)
Dept: CT IMAGING | Age: 82
End: 2022-07-02
Payer: COMMERCIAL

## 2022-07-02 VITALS
HEART RATE: 82 BPM | DIASTOLIC BLOOD PRESSURE: 69 MMHG | WEIGHT: 175 LBS | BODY MASS INDEX: 31 KG/M2 | SYSTOLIC BLOOD PRESSURE: 128 MMHG | TEMPERATURE: 98.5 F | RESPIRATION RATE: 16 BRPM | OXYGEN SATURATION: 94 %

## 2022-07-02 DIAGNOSIS — R51.9 ACUTE NONINTRACTABLE HEADACHE, UNSPECIFIED HEADACHE TYPE: ICD-10-CM

## 2022-07-02 DIAGNOSIS — U07.1 COVID-19: Primary | ICD-10-CM

## 2022-07-02 DIAGNOSIS — R53.1 GENERAL WEAKNESS: ICD-10-CM

## 2022-07-02 LAB
ALBUMIN SERPL-MCNC: 4 G/DL (ref 3.5–4.6)
ALP BLD-CCNC: 68 U/L (ref 40–130)
ALT SERPL-CCNC: 15 U/L (ref 0–33)
ANION GAP SERPL CALCULATED.3IONS-SCNC: 12 MEQ/L (ref 9–15)
AST SERPL-CCNC: 24 U/L (ref 0–35)
BASOPHILS ABSOLUTE: 0 K/UL (ref 0–0.2)
BASOPHILS RELATIVE PERCENT: 0.4 %
BILIRUB SERPL-MCNC: 0.3 MG/DL (ref 0.2–0.7)
BUN BLDV-MCNC: 14 MG/DL (ref 8–23)
CALCIUM SERPL-MCNC: 8.8 MG/DL (ref 8.5–9.9)
CHLORIDE BLD-SCNC: 97 MEQ/L (ref 95–107)
CO2: 24 MEQ/L (ref 20–31)
CREAT SERPL-MCNC: 0.85 MG/DL (ref 0.5–0.9)
EOSINOPHILS ABSOLUTE: 0 K/UL (ref 0–0.7)
EOSINOPHILS RELATIVE PERCENT: 0.3 %
GFR AFRICAN AMERICAN: >60
GFR NON-AFRICAN AMERICAN: >60
GLOBULIN: 3.3 G/DL (ref 2.3–3.5)
GLUCOSE BLD-MCNC: 152 MG/DL (ref 70–99)
HCT VFR BLD CALC: 39.2 % (ref 37–47)
HEMOGLOBIN: 13.1 G/DL (ref 12–16)
LYMPHOCYTES ABSOLUTE: 0.8 K/UL (ref 1–4.8)
LYMPHOCYTES RELATIVE PERCENT: 7.6 %
MAGNESIUM: 2.2 MG/DL (ref 1.7–2.4)
MCH RBC QN AUTO: 28.5 PG (ref 27–31.3)
MCHC RBC AUTO-ENTMCNC: 33.4 % (ref 33–37)
MCV RBC AUTO: 85.3 FL (ref 82–100)
MONOCYTES ABSOLUTE: 1 K/UL (ref 0.2–0.8)
MONOCYTES RELATIVE PERCENT: 10.4 %
NEUTROPHILS ABSOLUTE: 8.1 K/UL (ref 1.4–6.5)
NEUTROPHILS RELATIVE PERCENT: 81.3 %
PDW BLD-RTO: 14.3 % (ref 11.5–14.5)
PLATELET # BLD: 149 K/UL (ref 130–400)
POTASSIUM SERPL-SCNC: 3.9 MEQ/L (ref 3.4–4.9)
RBC # BLD: 4.59 M/UL (ref 4.2–5.4)
SARS-COV-2, NAAT: DETECTED
SODIUM BLD-SCNC: 133 MEQ/L (ref 135–144)
TOTAL PROTEIN: 7.3 G/DL (ref 6.3–8)
TROPONIN: <0.01 NG/ML (ref 0–0.01)
WBC # BLD: 9.9 K/UL (ref 4.8–10.8)

## 2022-07-02 PROCEDURE — 2580000003 HC RX 258: Performed by: EMERGENCY MEDICINE

## 2022-07-02 PROCEDURE — 84484 ASSAY OF TROPONIN QUANT: CPT

## 2022-07-02 PROCEDURE — 83735 ASSAY OF MAGNESIUM: CPT

## 2022-07-02 PROCEDURE — 99285 EMERGENCY DEPT VISIT HI MDM: CPT

## 2022-07-02 PROCEDURE — 6370000000 HC RX 637 (ALT 250 FOR IP): Performed by: EMERGENCY MEDICINE

## 2022-07-02 PROCEDURE — 85025 COMPLETE CBC W/AUTO DIFF WBC: CPT

## 2022-07-02 PROCEDURE — 6360000004 HC RX CONTRAST MEDICATION: Performed by: EMERGENCY MEDICINE

## 2022-07-02 PROCEDURE — 96374 THER/PROPH/DIAG INJ IV PUSH: CPT

## 2022-07-02 PROCEDURE — 71045 X-RAY EXAM CHEST 1 VIEW: CPT

## 2022-07-02 PROCEDURE — 6360000002 HC RX W HCPCS: Performed by: EMERGENCY MEDICINE

## 2022-07-02 PROCEDURE — 36415 COLL VENOUS BLD VENIPUNCTURE: CPT

## 2022-07-02 PROCEDURE — 80053 COMPREHEN METABOLIC PANEL: CPT

## 2022-07-02 PROCEDURE — 87635 SARS-COV-2 COVID-19 AMP PRB: CPT

## 2022-07-02 PROCEDURE — 96375 TX/PRO/DX INJ NEW DRUG ADDON: CPT

## 2022-07-02 PROCEDURE — 71275 CT ANGIOGRAPHY CHEST: CPT

## 2022-07-02 PROCEDURE — 93005 ELECTROCARDIOGRAM TRACING: CPT | Performed by: EMERGENCY MEDICINE

## 2022-07-02 RX ORDER — DEXAMETHASONE 6 MG/1
6 TABLET ORAL
Qty: 5 TABLET | Refills: 0 | Status: SHIPPED | OUTPATIENT
Start: 2022-07-02 | End: 2022-07-07

## 2022-07-02 RX ORDER — 0.9 % SODIUM CHLORIDE 0.9 %
1000 INTRAVENOUS SOLUTION INTRAVENOUS ONCE
Status: COMPLETED | OUTPATIENT
Start: 2022-07-02 | End: 2022-07-02

## 2022-07-02 RX ORDER — ACETAMINOPHEN 500 MG
1000 TABLET ORAL ONCE
Status: COMPLETED | OUTPATIENT
Start: 2022-07-02 | End: 2022-07-02

## 2022-07-02 RX ORDER — DIPHENHYDRAMINE HYDROCHLORIDE 50 MG/ML
25 INJECTION INTRAMUSCULAR; INTRAVENOUS ONCE
Status: COMPLETED | OUTPATIENT
Start: 2022-07-02 | End: 2022-07-02

## 2022-07-02 RX ORDER — DEXAMETHASONE SODIUM PHOSPHATE 10 MG/ML
6 INJECTION INTRAMUSCULAR; INTRAVENOUS ONCE
Status: COMPLETED | OUTPATIENT
Start: 2022-07-02 | End: 2022-07-02

## 2022-07-02 RX ORDER — HYDROCODONE BITARTRATE AND ACETAMINOPHEN 5; 325 MG/1; MG/1
1 TABLET ORAL EVERY 6 HOURS PRN
Qty: 10 TABLET | Refills: 0 | Status: SHIPPED | OUTPATIENT
Start: 2022-07-02 | End: 2022-07-05

## 2022-07-02 RX ORDER — ONDANSETRON 2 MG/ML
4 INJECTION INTRAMUSCULAR; INTRAVENOUS ONCE
Status: COMPLETED | OUTPATIENT
Start: 2022-07-02 | End: 2022-07-02

## 2022-07-02 RX ORDER — METOCLOPRAMIDE HYDROCHLORIDE 5 MG/ML
10 INJECTION INTRAMUSCULAR; INTRAVENOUS ONCE
Status: COMPLETED | OUTPATIENT
Start: 2022-07-02 | End: 2022-07-02

## 2022-07-02 RX ADMIN — IOPAMIDOL 75 ML: 612 INJECTION, SOLUTION INTRAVENOUS at 18:43

## 2022-07-02 RX ADMIN — METOCLOPRAMIDE HYDROCHLORIDE 10 MG: 5 INJECTION INTRAMUSCULAR; INTRAVENOUS at 17:42

## 2022-07-02 RX ADMIN — DEXAMETHASONE SODIUM PHOSPHATE 6 MG: 10 INJECTION INTRAMUSCULAR; INTRAVENOUS at 17:42

## 2022-07-02 RX ADMIN — HYDROMORPHONE HYDROCHLORIDE 0.5 MG: 1 INJECTION, SOLUTION INTRAMUSCULAR; INTRAVENOUS; SUBCUTANEOUS at 18:55

## 2022-07-02 RX ADMIN — ACETAMINOPHEN 1000 MG: 500 TABLET ORAL at 17:43

## 2022-07-02 RX ADMIN — ONDANSETRON 4 MG: 2 INJECTION INTRAMUSCULAR; INTRAVENOUS at 18:54

## 2022-07-02 RX ADMIN — DIPHENHYDRAMINE HYDROCHLORIDE 25 MG: 50 INJECTION, SOLUTION INTRAMUSCULAR; INTRAVENOUS at 17:42

## 2022-07-02 RX ADMIN — SODIUM CHLORIDE 1000 ML: 9 INJECTION, SOLUTION INTRAVENOUS at 17:45

## 2022-07-02 ASSESSMENT — PAIN - FUNCTIONAL ASSESSMENT: PAIN_FUNCTIONAL_ASSESSMENT: NONE - DENIES PAIN

## 2022-07-02 ASSESSMENT — ENCOUNTER SYMPTOMS
VOMITING: 0
ABDOMINAL PAIN: 0
NAUSEA: 0
SORE THROAT: 0
SHORTNESS OF BREATH: 1
COUGH: 1
DIARRHEA: 0
BACK PAIN: 0

## 2022-07-02 ASSESSMENT — PAIN DESCRIPTION - DESCRIPTORS
DESCRIPTORS: ACHING
DESCRIPTORS: ACHING

## 2022-07-02 ASSESSMENT — PAIN SCALES - GENERAL
PAINLEVEL_OUTOF10: 6
PAINLEVEL_OUTOF10: 9
PAINLEVEL_OUTOF10: 6

## 2022-07-02 ASSESSMENT — PAIN DESCRIPTION - LOCATION
LOCATION: HEAD
LOCATION: HEAD

## 2022-07-02 NOTE — ED PROVIDER NOTES
35[address] [facility] ED  eMERGENCYdEPARTMENT eNCOUnter      Pt Name: Modesto Mckeon  MRN: [id number]  Erikgfyomi 1940  Date of evaluation: 7/2/2022  Deonte Warren MD    CHIEF COMPLAINT           HPI  Modesto Mckeon is a 80 y.o. female per chart review has a h/o breast CA presents to the ED with gradual onset of constant, moderate SOB for 2 days. Pt tested positive for covid with at home test this AM. Pt notes cough, congestion and HA. Denies any CP, abd pain, N/V, dizziness. ROS  Review of Systems   Constitutional: Positive for fatigue. Negative for activity change, chills and fever. HENT: Positive for congestion. Negative for ear pain and sore throat. Eyes: Negative for visual disturbance. Respiratory: Positive for cough and shortness of breath. Cardiovascular: Negative for chest pain, palpitations and leg swelling. Gastrointestinal: Negative for abdominal pain, diarrhea, nausea and vomiting. Genitourinary: Negative for dysuria. Musculoskeletal: Negative for back pain. Skin: Negative for rash. Neurological: Positive for headaches. Negative for dizziness and weakness. Except as noted above the remainder of the review of systems was reviewed and negative.        PAST MEDICAL HISTORY     Past Medical History:   Diagnosis Date    Breast cancer ([facility] [address]     Cancer ([facility] [address]     Right breast    Cancer ([facility] [address]     skin    Chronic back pain     Hx antineoplastic chemo     Urinary incontinence          SURGICAL HISTORY       Past Surgical History:   Procedure Laterality Date    BREAST BIOPSY Right 10/28/2021    RIGHT BREAST LUMPECTOMY AND SENTINEL LYMPH NODE BIOPSY performed by Jannette Aleman MD at [address] [place] [address] Av LUMPECTOMY Right 10/28/2021    and [facility]    BUNIONECTOMY Right 09/2021    HAMMER TOE SURGERY Right 09/2021    PORT SURGERY Left 2/1/2022    INSERTION SUBCUTANEOUS VENOUS PORT performed by Lidia Wilson MD at [place] [address] [employer] in the Last Year: Never true    [address] in the Last Year: Never true   Transportation Needs: No Transportation Needs    Lack of Transportation (Medical): No    Lack of Transportation (Non-Medical): No   Physical Activity:     Days of Exercise per Week: Not on file    Minutes of Exercise per Session: Not on file   Stress:     Feeling of Stress : Not on file   Social Connections:     Frequency of Communication with Friends and Family: Not on file    Frequency of Social Gatherings with Friends and Family: Not on file    Attends Taoism Services: Not on file    Active Member of [employer] or Organizations: Not on file    Attends Club or Organization Meetings: Not on file    Marital Status: Not on file   Intimate Partner Violence:     Fear of Current or Ex-Partner: Not on file    Emotionally Abused: Not on file    Physically Abused: Not on file    Sexually Abused: Not on file   Housing Stability:     Unable to Pay for Housing in the Last Year: Not on file    Number of [place] in the Last Year: Not on file    Unstable Housing in the Last Year: Not on file         PHYSICAL EXAM       ED Triage Vitals [07/02/22 1707]   BP Temp Temp src Heart Rate Resp SpO2 Height Weight   (!) 152/92 98.5 °F (36.9 °C) -- (!) 118 20 94 % -- 175 lb (79.4 kg)       Physical Exam  Vitals and nursing note reviewed. Constitutional:       Appearance: She is well-developed and normal weight. HENT:      Head: Normocephalic. Right Ear: External ear normal.      Left Ear: External ear normal.   Eyes:      Conjunctiva/sclera: Conjunctivae normal.      Pupils: Pupils are equal, round, and reactive to light. Cardiovascular:      Rate and Rhythm: Regular rhythm. Tachycardia present. Heart sounds: Normal heart sounds. Pulmonary:      Effort: Pulmonary effort is normal. No respiratory distress. Breath sounds: Normal breath sounds. No stridor. No wheezing, rhonchi or rales.    Chest:      Chest wall: No tenderness. Abdominal:      General: Bowel sounds are normal. There is no distension. Palpations: Abdomen is soft. Tenderness: There is no abdominal tenderness. There is no guarding or rebound. Musculoskeletal:         General: Normal range of motion. Cervical back: Normal range of motion and neck supple. Skin:     General: Skin is warm and dry. Neurological:      General: No focal deficit present. Mental Status: She is alert and oriented to person, place, and time. Mental status is at baseline. Psychiatric:         Mood and Affect: Mood normal.         Behavior: Behavior normal.           MDM    Pt is a 81 yo female presents to the ED with SOB, HA. Covid positive. She is afebrile and hemodynamically stable. Tachycardic at . Given IV fluids, IV decadron, IV Reglan, IV Benadryl, IV Tylenol. EKG shows sinus tach with , normal axis, normal intervals, no ST changes. Labs unremarkable. CT PE negative. Pt is covid positive. Pt reassessed and feels better however still has a headache. Pt given IV dilaudid, IV zofran with complete relief. Pt has breast Ca and is getting chemo. Pt educated thoroughly about covid and weakness, sob, headache. Pt's HR rechecked and was 90s. Pt given covid warning signs, prescription for decadron, norco and will f/u with pcp. Pt understands plan. FINAL IMPRESSION      1. COVID-19    2. General weakness    3.  Acute nonintractable headache, unspecified headache type          DISPOSITION/PLAN   DISPOSITION Decision To Discharge 07/02/2022 07:07:52 PM        DISCHARGE MEDICATIONS:  [unfilled]         Irving Kahn MD(electronically signed)  Attending Emergency Physician           Irving Kahn MD  07/02/22 6872

## 2022-07-03 NOTE — ED NOTES
Took patient off oxygen to monitor if patients saturation drops   Daughter remains at bedside  Patient states pain is better     Zuleyka Go RN  07/02/22 2002

## 2022-07-05 LAB
EKG ATRIAL RATE: 109 BPM
EKG P AXIS: 71 DEGREES
EKG P-R INTERVAL: 144 MS
EKG Q-T INTERVAL: 320 MS
EKG QRS DURATION: 86 MS
EKG QTC CALCULATION (BAZETT): 430 MS
EKG R AXIS: 60 DEGREES
EKG T AXIS: 70 DEGREES
EKG VENTRICULAR RATE: 109 BPM

## 2022-08-02 ENCOUNTER — HOSPITAL ENCOUNTER (OUTPATIENT)
Dept: RADIATION ONCOLOGY | Age: 82
Discharge: HOME | End: 2022-08-02
Attending: RADIOLOGY
Payer: COMMERCIAL

## 2022-08-02 VITALS
TEMPERATURE: 97.8 F | WEIGHT: 183 LBS | BODY MASS INDEX: 32.42 KG/M2 | OXYGEN SATURATION: 94 % | HEART RATE: 87 BPM | DIASTOLIC BLOOD PRESSURE: 81 MMHG | RESPIRATION RATE: 16 BRPM | SYSTOLIC BLOOD PRESSURE: 164 MMHG

## 2022-08-02 DIAGNOSIS — Z17.1 CARCINOMA OF UPPER-OUTER QUADRANT OF RIGHT BREAST IN FEMALE, ESTROGEN RECEPTOR NEGATIVE (HCC): Primary | ICD-10-CM

## 2022-08-02 DIAGNOSIS — C50.411 CARCINOMA OF UPPER-OUTER QUADRANT OF RIGHT BREAST IN FEMALE, ESTROGEN RECEPTOR NEGATIVE (HCC): Primary | ICD-10-CM

## 2022-08-02 PROCEDURE — 99212 OFFICE O/P EST SF 10 MIN: CPT | Performed by: RADIOLOGY

## 2022-08-23 ENCOUNTER — HOSPITAL ENCOUNTER (OUTPATIENT)
Dept: NON INVASIVE DIAGNOSTICS | Age: 82
Discharge: HOME | End: 2022-08-23
Payer: COMMERCIAL

## 2022-08-23 DIAGNOSIS — T45.1X5S NEUTROPENIA ASSOCIATED WITH MUCOSITIS DUE TO ANTINEOPLASTIC THERAPY (HCC): ICD-10-CM

## 2022-08-23 DIAGNOSIS — D70.1 NEUTROPENIA ASSOCIATED WITH MUCOSITIS DUE TO ANTINEOPLASTIC THERAPY (HCC): ICD-10-CM

## 2022-08-23 DIAGNOSIS — K12.31 NEUTROPENIA ASSOCIATED WITH MUCOSITIS DUE TO ANTINEOPLASTIC THERAPY (HCC): ICD-10-CM

## 2022-08-23 LAB
LV EF: 55 %
LVEF MODALITY: NORMAL

## 2022-08-23 PROCEDURE — 93306 TTE W/DOPPLER COMPLETE: CPT

## 2022-09-03 NOTE — PROGRESS NOTES
17 [facility]           Radiation Oncology      2016 [facility]        Myriam Bland Maillard: [phone]        F: [phone]       [employer]                   Dr. Brent Palacios MD PhD    FOLLOW-UP NOTE     Date of Service: 2022  Patient ID: Larissa Real   : 1940  MRN: [id number]   Acct Number: [de-identified]       NAME:  Larissa Real    :  1940 80 y.o. female     PCP: Angel Durand MD    REFERRING PROVIDER: Niranjan Nielsen    DIAGNOSIS:  1. 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 All acute sequelae of radiation therapy have resolved and her skin has well-healed. She is still putting Aquaphor on the skin of the right breast.  She is continuing Trazimera and tolerating well. She otherwise denies any complaints. Past medical, surgical, social and family histories reviewed and updated as indicated.     ALLERGIES:  Atorvastatin, Clonidine, Fenofibrate micronized, Levofloxacin, Raloxifene, and Statins       MEDICATIONS:   Current Outpatient Medications:     Baclofen (LIORESAL) 5 MG tablet, Take 1 tablet by mouth 3 times daily as needed (Patient not taking: No sig reported), Disp: , Rfl:     vitamin D 25 MCG (1000 UT) CAPS, Take by mouth daily, Disp: , Rfl:     Trastuzumab-qyyp (TRAZIMERA IV), Infuse intravenously every 21 days , Disp: , Rfl: hydrocortisone 2.5 % cream, apply sparingly to affected area ON FACE TWICE A DAY (Patient not taking: No sig reported), Disp: , Rfl:     prochlorperazine (COMPAZINE) 10 MG tablet, take 1 tablet by mouth every 6 hours if needed for nausea (Patient not taking: Reported on 8/2/2022), Disp: , Rfl:     tolterodine (DETROL LA) 4 MG extended release capsule, , Disp: , Rfl:     ezetimibe (ZETIA) 10 MG tablet, Take 10 mg by mouth daily  (Patient not taking: Reported on 8/2/2022), Disp: , Rfl:     calcium citrate-vitamin D (CITRACAL+D) 315-200 MG-UNIT per tablet, Take 1 tablet by mouth (Patient not taking: No sig reported), Disp: , Rfl:     candesartan-hydrochlorothiazide (ATACAND HCT) 32-12.5 MG per tablet, , Disp: , Rfl:     tretinoin (RETIN-A) 0.1 % cream, Apply sparingly at bedtime (Patient not taking: No sig reported), Disp: , Rfl:     Current Facility-Administered Medications:     lidocaine 1 % injection 10 mL, 10 mL, IntraDERmal, Once, Jose Hahn MD    sodium bicarbonate 8.4 % injection 3 mEq, 3 mL, IntraVENous, Once, Jose Hahn MD    Review of Systems   All other systems reviewed and are negative. PHYSICAL EXAMINATION:      Vitals:    08/02/22 1501   BP: (!) 164/81   Pulse: 87   Resp: 16   Temp: 97.8 °F (36.6 °C)   SpO2: 94%   Weight: 183 lb (83 kg)       Wt Readings from Last 3 Encounters:   08/02/22 183 lb (83 kg)   07/02/22 175 lb (79.4 kg)   06/09/22 183 lb (83 kg)       ECOG PERFORMANCE STATUS:  1     Physical Exam  Vitals and nursing note reviewed. Constitutional:       General: She is not in acute distress. Appearance: Normal appearance. She is not ill-appearing, toxic-appearing or diaphoretic. Comments: She is present unaccompanied. HENT:      Head: Normocephalic and atraumatic. Eyes:      General: No scleral icterus. Extraocular Movements: Extraocular movements intact. Cardiovascular:      Rate and Rhythm: Regular rhythm. Heart sounds: Normal heart sounds.    Pulmonary: Breath sounds: Normal breath sounds. Chest:      Comments: Mild fibrosis of the skin of the right breast but otherwise no palpable abnormalities noted. Abdominal:      General: Bowel sounds are normal.      Palpations: Abdomen is soft. Genitourinary:     Comments: Deferred. Musculoskeletal:         General: Normal range of motion. Cervical back: Normal range of motion and neck supple. No rigidity. Lymphadenopathy:      Cervical: No cervical adenopathy. Skin:     General: Skin is warm and dry. Neurological:      General: No focal deficit present. Mental Status: She is alert and oriented to person, place, and time. Psychiatric:         Mood and Affect: Mood normal.         Behavior: Behavior normal.     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 She went on to receive weekly Taxol x12 with Trazimera every 3 weeks for total of the year. She completed Taxol on 4/12/2022 and will continue [place] for a total of a year. She went on to complete adjuvant radiation therapy to the whole right breast to 4256 cGy with a boost to the lumpectomy cavity to 5256 cGy. She returns for quick check approximately 6 weeks after completing radiation therapy and notes that all acute sequelae have resolved. I reassured her that she does have the right direction and I will see her again for routine follow-up on 12/6/2022 after she has her mammogram in November. She will be seeing Dr. Linda Briscoe with breast surgery on 11/21/2022 and her mammogram will be scheduled for 11/16/2022. In the interim she will be continuing Trazimera for total of 1 year. ORDERS: None    FOLLOW-UP: Return on 12/6/2022 for routine follow-up.     Deya Snow MD PhD  Radiation Oncologist, [address] [place] [address]

## 2022-11-14 ENCOUNTER — OFFICE VISIT (OUTPATIENT)
Dept: OBGYN CLINIC | Age: 82
End: 2022-11-14
Payer: COMMERCIAL

## 2022-11-14 VITALS
HEIGHT: 64 IN | BODY MASS INDEX: 31.41 KG/M2 | WEIGHT: 184 LBS | SYSTOLIC BLOOD PRESSURE: 134 MMHG | DIASTOLIC BLOOD PRESSURE: 82 MMHG

## 2022-11-14 DIAGNOSIS — N39.41 URGE INCONTINENCE: ICD-10-CM

## 2022-11-14 DIAGNOSIS — Z01.419 ENCOUNTER FOR WELL WOMAN EXAM WITH ROUTINE GYNECOLOGICAL EXAM: Primary | ICD-10-CM

## 2022-11-14 PROCEDURE — 99387 INIT PM E/M NEW PAT 65+ YRS: CPT | Performed by: OBSTETRICS & GYNECOLOGY

## 2022-11-14 RX ORDER — TOLTERODINE 4 MG/1
4 CAPSULE, EXTENDED RELEASE ORAL DAILY
Qty: 30 CAPSULE | Refills: 6 | Status: SHIPPED | OUTPATIENT
Start: 2022-11-14

## 2022-11-14 ASSESSMENT — ENCOUNTER SYMPTOMS
ABDOMINAL PAIN: 0
RESPIRATORY NEGATIVE: 1
ALLERGIC/IMMUNOLOGIC NEGATIVE: 1
NAUSEA: 0
CONSTIPATION: 0
ANAL BLEEDING: 0
BLOOD IN STOOL: 0
RECTAL PAIN: 0
DIARRHEA: 0
ABDOMINAL DISTENTION: 0
VOMITING: 0
EYES NEGATIVE: 1

## 2022-11-14 ASSESSMENT — VISUAL ACUITY: OU: 1

## 2022-11-14 NOTE — PROGRESS NOTES
The patient was asked if she would like a chaperone present for her intimate exam. She  Declined the chaperone.  Gerard Smith CMA ([address] [place] [address])

## 2022-11-14 NOTE — PROGRESS NOTES
Subjective:      Patient ID: Chandler Grant is a 80 y.o. female    Annual exam. New patient; reviewed medical, surgical, social and family history. Also reviewed current medications and allergies. No PMB. No GYN complaints. Pap deferred. H/O right breast lumpectomy for breast CA. Mammogram and breast care per Dr Gayla Okeefe. Declines breast exam today as sees Dr Gayla Okeefe this week. Monthly SBE encouraged. Dexa scan ordered per protocol. Screening colonoscopy recommended per routine. H/O urge incontinence. Previously on Dtrol, but stopped during chemo and sx increased. Restarted Detrol and if sx continue, will F/U for other options. Denies dysuria, urgency or frequency. F/U annual exam or prn.     Vitals:  /82   Ht 5' 4\" (1.626 m)   Wt 184 lb (83.5 kg)   BMI 31.58 kg/m²   Past Medical History:   Diagnosis Date    Breast cancer ([facility] [facility]     Cancer ([facility] [address]     Right breast    Cancer ([facility] [address]     skin    Chronic back pain     COVID 06/30/2022    Elevated cholesterol with elevated triglycerides     High cholesterol     Hx antineoplastic chemo     Hypertension     Urinary incontinence      Past Surgical History:   Procedure Laterality Date    BREAST BIOPSY Right 10/28/2021    RIGHT BREAST LUMPECTOMY AND SENTINEL LYMPH NODE BIOPSY performed by Hilaria Butler MD at [address] [place] [address] LUMPECTOMY Right 10/28/2021    and SLNB    BUNIONECTOMY Right 09/2021    HAMMER TOE SURGERY Right 09/2021    MOHS SURGERY      Face    PORT SURGERY Left 02/01/2022    INSERTION SUBCUTANEOUS VENOUS PORT performed by Che Hernandez MD at . [facility]       Allergies:  Atorvastatin, Clonidine, Fenofibrate micronized, Levofloxacin, Raloxifene, and Statins  Current Outpatient Medications   Medication Sig Dispense Refill    tolterodine (DETROL LA) 4 MG extended release capsule Take 1 capsule by mouth daily 30 capsule 6    candesartan-hydrochlorothiazide (ATACAND HCT) 32-12.5 MG per tablet       Baclofen (LIORESAL) 5 MG tablet Take 1 tablet by mouth 3 times daily as needed (Patient not taking: No sig reported)      vitamin D 25 MCG (1000 UT) CAPS Take by mouth daily      Trastuzumab-qyyp (TRAZIMERA IV) Infuse intravenously every 21 days       hydrocortisone 2.5 % cream apply sparingly to affected area ON FACE TWICE A DAY (Patient not taking: No sig reported)      prochlorperazine (COMPAZINE) 10 MG tablet take 1 tablet by mouth every 6 hours if needed for nausea (Patient not taking: Reported on 8/2/2022)      ezetimibe (ZETIA) 10 MG tablet Take 10 mg by mouth daily  (Patient not taking: Reported on 8/2/2022)      calcium citrate-vitamin D (CITRACAL+D) 315-200 MG-UNIT per tablet Take 1 tablet by mouth (Patient not taking: No sig reported)      tretinoin (RETIN-A) 0.1 % cream Apply sparingly at bedtime (Patient not taking: No sig reported)       Current Facility-Administered Medications   Medication Dose Route Frequency Provider Last Rate Last Admin    lidocaine 1 % injection 10 mL  10 mL IntraDERmal Once Chepe Clark MD        sodium bicarbonate 8.4 % injection 3 mEq  3 mL IntraVENous Once Chepe Clark MD         Social History     Socioeconomic History    Marital status:      Spouse name: Not on file    Number of children: Not on file    Years of education: Not on file    Highest education level: Not on file   Occupational History    Not on file   Tobacco Use    Smoking status: Former    Smokeless tobacco: Never    Tobacco comments:     Quit 45 years ago as of 11/22/21   Vaping Use    Vaping Use: Never used   Substance and Sexual Activity    Alcohol use: No    Drug use: No    Sexual activity: Not on file   Other Topics Concern    Not on file   Social History Narrative    Not on file     Social Determinants of Health     Financial Resource Strain: Not on file   Food Insecurity: Not on file   Transportation Needs: Not on file   Physical Activity: Not on file   Stress: Not on file   Social Connections: Not on file   Intimate Partner Violence: Not on file   Housing Stability: Not on file     Family History   Problem Relation Age of Onset    Cancer Father     Diabetes Father     Breast Cancer Sister        Review of Systems   Constitutional: Negative. Negative for activity change, appetite change, chills, diaphoresis, fatigue, fever and unexpected weight change. HENT: Negative. Eyes: Negative. Respiratory: Negative. Cardiovascular: Negative. Gastrointestinal:  Negative for abdominal distention, abdominal pain, anal bleeding, blood in stool, constipation, diarrhea, nausea, rectal pain and vomiting. Endocrine: Negative. Genitourinary:  Negative for decreased urine volume, difficulty urinating, dyspareunia, dysuria, enuresis, flank pain, frequency, genital sores, hematuria, menstrual problem, pelvic pain, urgency, vaginal bleeding, vaginal discharge and vaginal pain. Musculoskeletal: Negative. Skin: Negative. Allergic/Immunologic: Negative. Neurological: Negative. Hematological: Negative. Psychiatric/Behavioral: Negative. Objective:     Physical Exam  Constitutional:       General: She is not in acute distress. Appearance: She is well-developed. She is not diaphoretic. HENT:      Head: Normocephalic and atraumatic. Eyes:      General: Lids are normal. Vision grossly intact. Conjunctiva/sclera: Conjunctivae normal.   Neck:      Thyroid: No thyromegaly. Cardiovascular:      Rate and Rhythm: Normal rate and regular rhythm. Heart sounds: Normal heart sounds. Pulmonary:      Effort: Pulmonary effort is normal. No respiratory distress. Breath sounds: Normal breath sounds. No wheezing or rales. Chest:      Comments: Declined breast exam today and seeing Dr Heather Sharp this week for F/U and exam.   Abdominal:      General: There is no distension. Palpations: Abdomen is soft. There is no mass. Tenderness: There is no abdominal tenderness. There is no guarding or rebound. Hernia: No hernia is present. There is no hernia in the left inguinal area or right inguinal area. Genitourinary:     General: Normal vulva. Pubic Area: No rash. Labia:         Right: No rash, tenderness, lesion or injury. Left: No rash, tenderness, lesion or injury. Urethra: No prolapse, urethral swelling or urethral lesion. Vagina: Normal. No signs of injury and foreign body. No vaginal discharge, erythema, tenderness or bleeding. Cervix: No cervical motion tenderness, discharge or friability. Uterus: Not deviated, not enlarged, not fixed and not tender. Adnexa:         Right: No mass, tenderness or fullness. Left: No mass, tenderness or fullness. Rectum: Normal.   Musculoskeletal:         General: No tenderness or deformity. Normal range of motion. Cervical back: Normal range of motion and neck supple. Lymphadenopathy:      Cervical: No cervical adenopathy. Upper Body:      Right upper body: No supraclavicular or axillary adenopathy. Left upper body: No supraclavicular or axillary adenopathy. Lower Body: No right inguinal adenopathy. No left inguinal adenopathy. Skin:     General: Skin is warm and dry. Coloration: Skin is not pale. Findings: No erythema or rash. Neurological:      Mental Status: She is alert and oriented to person, place, and time. Motor: No abnormal muscle tone. Coordination: Coordination normal.   Psychiatric:         Behavior: Behavior normal.         Thought Content: Thought content normal.         Judgment: Judgment normal.       Assessment:      Diagnosis Orders   1. Encounter for well woman exam with routine gynecological exam        2.  Urge incontinence              Plan:      Medications placedthis encounter:  Orders Placed This Encounter   Medications    tolterodine (DETROL LA) 4 MG extended release capsule     Sig: Take 1 capsule by mouth daily     Dispense:  30 capsule Refill:  6         Orders placedthis encounter:  No orders of the defined types were placed in this encounter. Follow up:  Return in about 1 year (around 11/14/2023) for Annual.  Repeat Annual GYN exam every 1 year. Cervical Cytology exam starts age 24. If Negative Cytology;  follow-up screening per current guidelines. Mammograms yearly starting at age 36. Calcium and Vitamin D dosing reviewed ( age appropriate ). Colonoscopy and bone density screening discussed ( age appropriate ). Birth control and STD prevention discussed ( age appropriate ). Gardisil counseling completed for all patients ( age appropriate ). Routine health maintenance ( per PCP and guidelines ).

## 2022-11-16 ENCOUNTER — HOSPITAL ENCOUNTER (OUTPATIENT)
Dept: WOMENS IMAGING | Age: 82
Discharge: HOME | End: 2022-11-18
Payer: COMMERCIAL

## 2022-11-16 DIAGNOSIS — Z17.1 CARCINOMA OF UPPER-OUTER QUADRANT OF RIGHT BREAST IN FEMALE, ESTROGEN RECEPTOR NEGATIVE (HCC): ICD-10-CM

## 2022-11-16 DIAGNOSIS — C50.411 CARCINOMA OF UPPER-OUTER QUADRANT OF RIGHT BREAST IN FEMALE, ESTROGEN RECEPTOR NEGATIVE (HCC): ICD-10-CM

## 2022-11-16 DIAGNOSIS — N64.9 BREAST DISORDER: ICD-10-CM

## 2022-11-16 PROCEDURE — G0279 TOMOSYNTHESIS, MAMMO: HCPCS

## 2022-11-21 ENCOUNTER — OFFICE VISIT (OUTPATIENT)
Dept: SURGERY | Age: 82
End: 2022-11-21
Payer: COMMERCIAL

## 2022-11-21 VITALS
SYSTOLIC BLOOD PRESSURE: 187 MMHG | RESPIRATION RATE: 16 BRPM | BODY MASS INDEX: 32.06 KG/M2 | WEIGHT: 187.8 LBS | HEART RATE: 89 BPM | TEMPERATURE: 97.2 F | OXYGEN SATURATION: 97 % | DIASTOLIC BLOOD PRESSURE: 88 MMHG | HEIGHT: 64 IN

## 2022-11-21 DIAGNOSIS — Z17.1 CARCINOMA OF UPPER-OUTER QUADRANT OF RIGHT BREAST IN FEMALE, ESTROGEN RECEPTOR NEGATIVE (HCC): Primary | ICD-10-CM

## 2022-11-21 DIAGNOSIS — Z12.31 ENCOUNTER FOR SCREENING MAMMOGRAM FOR BREAST CANCER: ICD-10-CM

## 2022-11-21 DIAGNOSIS — C50.411 CARCINOMA OF UPPER-OUTER QUADRANT OF RIGHT BREAST IN FEMALE, ESTROGEN RECEPTOR NEGATIVE (HCC): Primary | ICD-10-CM

## 2022-11-21 DIAGNOSIS — E66.9 OBESITY, CLASS I, BMI 30-34.9: ICD-10-CM

## 2022-11-21 PROCEDURE — 1090F PRES/ABSN URINE INCON ASSESS: CPT | Performed by: SURGERY

## 2022-11-21 PROCEDURE — G8484 FLU IMMUNIZE NO ADMIN: HCPCS | Performed by: SURGERY

## 2022-11-21 PROCEDURE — G8427 DOCREV CUR MEDS BY ELIG CLIN: HCPCS | Performed by: SURGERY

## 2022-11-21 PROCEDURE — G8417 CALC BMI ABV UP PARAM F/U: HCPCS | Performed by: SURGERY

## 2022-11-21 PROCEDURE — 1036F TOBACCO NON-USER: CPT | Performed by: SURGERY

## 2022-11-21 PROCEDURE — 99214 OFFICE O/P EST MOD 30 MIN: CPT | Performed by: SURGERY

## 2022-11-21 PROCEDURE — G8399 PT W/DXA RESULTS DOCUMENT: HCPCS | Performed by: SURGERY

## 2022-11-21 PROCEDURE — 1123F ACP DISCUSS/DSCN MKR DOCD: CPT | Performed by: SURGERY

## 2022-11-21 NOTE — PROGRESS NOTES
PATIENT:    Tamela Erwin     DATE:      11/21/2022     TIME: 9:46 AM     Subjective:     Tamela Erwin presents for follow-up of breast cancer. She is well and does not do breast exams. Finishing herceptin     The breast cancer is Cancer Staging  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, estrogen receptor negative ([facility] [address]  Staging form: Breast, AJCC 8th Edition  - Clinical stage from 10/12/2021: Stage IA (cT1, cN0, cM0, G3, ER-, UT-, HER2+) - Signed by Reece Haynes MD on 11/18/2021       She does not perform self breast exams. She denies breast pain, masses, skin changes, nipple discharge, nipple retraction, or recent breast trauma bilaterally. Patient's medications, allergies, past medical, surgical, social and family histories were reviewed and updated as appropriate.     Current Outpatient Medications on File Prior to Visit   Medication Sig Dispense Refill    tolterodine (DETROL LA) 4 MG extended release capsule Take 1 capsule by mouth daily 30 capsule 6    calcium citrate-vitamin D (CITRACAL+D) 315-200 MG-UNIT per tablet Take 1 tablet by mouth      vitamin D 25 MCG (1000 UT) CAPS Take by mouth daily (Patient not taking: Reported on 11/21/2022)      Trastuzumab-qyyp (TRAZIMERA IV) Infuse intravenously every 21 days       ezetimibe (ZETIA) 10 MG tablet Take 10 mg by mouth daily  (Patient not taking: Reported on 8/2/2022)      candesartan-hydrochlorothiazide (ATACAND HCT) 32-12.5 MG per tablet        Current Facility-Administered Medications on File Prior to Visit   Medication Dose Route Frequency Provider Last Rate Last Admin    lidocaine 1 % injection 10 mL  10 mL IntraDERmal Once Reece Haynes MD        sodium bicarbonate 8.4 % injection 3 mEq  3 mL IntraVENous Once Reece Haynes MD            Any over the counter meds / herbal supplements / vitamins: yes     Objective:     Vitals:    11/21/22 0938 11/21/22 0940   BP: (!) 188/84 (!) 187/88   Site: Left Upper Arm Left Upper Arm   Pulse: 89 Resp: 16    Temp: 97.2 °F (36.2 °C)    SpO2: 97%    Weight: 187 lb 12.8 oz (85.2 kg)    Height: 5' 4\" (1.626 m)         Lymphedema Screening 11/21/2022 5/16/2022   RIGHT 10 cm (Hand) 20.5 20.5   RIGHT 20 cm (Wrist) 16.5 16   RIGHT 30 cm (Forearm) 25 21   RIGHT 60 cm (Upper Arm) 32 32.5       Physical Exam              IMAGING:     [facility] DIGITAL DIAGNOSTIC BILATERAL    Result Date: 11/16/2022  EXAMINATION: DIAGNOSTIC DIGITAL BILATERAL BREASTS MAMMOGRAM WITH TOMOSYNTHESIS, 11/16/2022 8:37 am TECHNIQUE: Diagnostic mammography of the bilateral breasts was performed with tomosynthesis. 2D standard and 3D tomosynthesis combination imaging performed through both breasts. Computer aided detection was utilized in the interpretation of this exam. COMPARISON: Multiple, most recent 2021 HISTORY: ORDERING SYSTEM PROVIDED HISTORY: Breast disorder, 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, estrogen receptor negative ([address] 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, estrogen receptor negative ([address] Right lumpectomy. FINDINGS: Density: There are scattered areas of fibroglandular density. Post lumpectomy changes are noted in the upper outer right breast.  No suspicious findings were identified in either breast.     No evidence of malignancy. Patient was given results at the time of the exam. BIRADS: BIRADS - CATEGORY 2 Benign Findings. Normal interval follow-up is recommended in 12 months. OVERALL ASSESSMENT - BENIGN A letter of notification will be sent to the patient regarding the results. The [employer] of [employer] recommends annual mammograms for women 40 years and older. Assessment:       ICD-10-CM    1. 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, estrogen receptor negative ([address]  C50.411     Z17.1       2. Obesity, Class I, BMI 30-34.9  E66.9       3.  Encounter for screening mammogram for breast cancer  Z12.31 [facility] BRENDAN DIGITAL SCREEN BILATERAL Plan:     She says BP is high bc she was rushing here. She will see pcp    Patient is doing well. Recommend clinical breast exams in the breast surgical suite in 6 month(s)  Recommend an active lifestyle, healthy diet, limited alcohol intake, achieve and maintain a healthy BMI to optimize breast cancer outcomes / decrease risk of breast cancer. Referral to or Follow up with Medical Oncology  Referral to or Follow up with Radiation Oncology  Follow up with PCP regarding all medical problems        I spent a total of 30 minutes on the date of the service which included preparing to see the patient, face-to-face patient care, completing clinical documentation, obtaining and/or reviewing separately obtained history, performing a medically appropriate examination, counseling and educating the patient/family/caregiver, ordering medications, tests, or procedures, communicating with other HCPs (not separately reported), independently interpreting results (not separately reported), communicating results to the patient/family/caregiver and care coordination (not separately reported). Discussion regarding natural history and potential progression of breast changes, timing of surveillance visits, timing and type of surveillance imaging, life-style modification, exercise, and limiting alcohol intake were performed today. I personally and independently saw and examined patient and reviewed all pertinent laboratory data and imaging studies. I have reviewed and agree with the history and review of systems as documented in the above note. This document is generated, in part, by voice recognition software and thus syntax and grammatical errors are possible. Dictated by Kristian Amaya MD 11/21/2022 9:46 AM       This note was partially generated using Dragon voice recognition system, and there may be some incorrect words, spellings, punctuation that were not noticed in checking the note before saving.

## 2022-11-21 NOTE — PATIENT INSTRUCTIONS
Patient Education        Preventing Lymphedema After Treatment for Breast Cancer: Care Instructions  Your Care Instructions     Lymphedema is a buildup of fluid in the soft tissues of the body. It can happen in the arm after breast cancer surgery to remove lymph nodes. If there are few or no lymph nodes, fluid can build up in the arm. It can also happen if the lymph system in an arm has been damaged. Infection, tumors, and scar tissue from radiation therapy to the armpit area also can cause fluid to build up. You may be able to avoid lymphedema or keep it under control by following the tips below. Make sure that you take good care of the skin on your arm and hand. Your skin acts as a barrier to keep out bacteria and prevent infection. It is also important not to overuse the muscles in the arm. And don't expose your arm to very hot or cold temperatures. Lymphedema can happen soon after breast cancer treatment. Or it may happen many years later. It may affect only part of your arm or hand. In some cases, it affects all of the arm. Make sure to follow these precautions even after you finish treatment. Do not ignore tightness or swelling in or around your arm or hand. You are less likely to have long-term problems if you get these symptoms treated right away. Follow-up care is a key part of your treatment and safety. Be sure to make and go to all appointments, and call your doctor if you are having problems. It's also a good idea to know your test results and keep a list of the medicines you take. How can you care for yourself at home? Skin care    Keep your arm, hand, and armpit clean. Use a mild soap that does not dry out your skin. Moisturize your skin often. Take good care of the skin around your fingernails. Do not bite or cut your cuticles. Ask your doctor how to handle any cuts, scratches, insect bites, or other injuries you may get.      Use sunscreen and insect repellent outdoors to protect your skin from sunburn and insect bites. Use an electric razor if you shave your armpit. It's less likely to cut or irritate your skin. Activity    Don't wear clothing or jewelry that is tight on your arm or hand. Your doctor may advise you not to wear a watch or rings on the affected hand. Wear gloves when you do activities that could hurt the skin on your fingers or hand. Wear them when you garden, do yard work, wash dishes, and clean with chemicals. Use oven mitts when you handle hot food. Do not have blood drawn from the arm on the side of the lymph node surgery. Do not get injections (shots) or have an IV put in the affected arm. Do not allow a blood pressure cuff to be placed on that arm. If you are in the hospital, make sure you tell your nurse and other hospital staff about your condition. Do not expose your arm to very hot or very cold temperatures. For example, don't use hot tubs, saunas, or steam rooms. Don't use a heating pad or cold pack on that arm or shoulder. Rest your arm often when you do repeated movements, such as vacuum, scrub, or mop. Try to use your other arm to carry heavy things, such as grocery bags. If you carry a briefcase or a purse, avoid shoulder straps and carry it on your good arm. Ask your doctor about wearing a compression sleeve and glove (gauntlet). Your doctor may want you to wear these when you exercise or when you fly in an airplane. They can help keep fluid from pooling in your arm and hand. Exercise    Ask your doctor about exercises for your arm and hand. Your doctor may recommend that you see a physical therapist. This person can teach you how to do self-massage to move fluid out of your arm. Check with your doctor before you start exercises that use the arm. This includes tennis, rowing, or weight lifting. Your doctor can help you find an activity level that's right for you. When should you call for help?    Call your doctor now or seek immediate medical care if:    You have signs of infection, such as: Increased pain, swelling, warmth, or redness. Red streaks leading from the area. Pus draining from the area. A fever. Watch closely for changes in your health, and be sure to contact your doctor if:    You have new or worse symptoms from lymphedema. You do not get better as expected. Where can you learn more? Go to [email or web address]://[email or web address]. org and sign in to your [employer] account. Enter G546 in the [employer] box to learn more about \"Preventing Lymphedema After Treatment for Breast Cancer: Care Instructions. \"     If you do not have an account, please click on the \"Sign Up Now\" link. Current as of: May 4, 2022               Content Version: 13.4  © 1296-3423 [employer], Incorporated. Care instructions adapted under license by [facility] [place] ([facility]). If you have questions about a medical condition or this instruction, always ask your healthcare professional. [place] [address] any warranty or liability for your use of this information.

## 2022-11-21 NOTE — PROGRESS NOTES
Reason for today's visit:  6 month follow up right breast cancer, continues on herceptin, BUE joint/muscle aches    Palpates a breast change? no  Nipple discharge: no  Breast Pain: no  Breast Mass: no  Swelling in either upper extremity? no    Wellness:    Self breast self exams: no  Regular exercise routine: no  Following Lymphedema awareness/precautions: yes   Managing stress:no  Stress level on a scale of 1 - 10: 3-4    Instruction:    Follow up per provider  Imaging per provider  Monthly self breast exams  Healthy diet  Exercise program  Lymphedema precautions/awareness    Other:    Requisitions needed:no  Bras/prosthesis: no  Compression Sleeve/glove: no  Lymphedema Measurements: see flow sheet  Therapy: no  PT/OT/Lymphedema: no  Follow up as advised per Dr Jake Womack    Lymphedema Screening 11/21/2022 5/16/2022   RIGHT 10 cm (Hand) 20.5 20.5   RIGHT 20 cm (Wrist) 16.5 16   RIGHT 30 cm (Forearm) 25 21   RIGHT 60 cm (Upper Arm) 32 32.5

## 2022-11-25 ENCOUNTER — HOSPITAL ENCOUNTER (OUTPATIENT)
Dept: NON INVASIVE DIAGNOSTICS | Age: 82
Discharge: HOME | End: 2022-11-25
Payer: COMMERCIAL

## 2022-11-25 PROCEDURE — 93356 MYOCRD STRAIN IMG SPCKL TRCK: CPT

## 2022-11-25 PROCEDURE — 93306 TTE W/DOPPLER COMPLETE: CPT

## 2022-12-16 ENCOUNTER — HOSPITAL ENCOUNTER (OUTPATIENT)
Dept: RADIATION ONCOLOGY | Age: 82
Discharge: HOME | End: 2022-12-16
Attending: RADIOLOGY
Payer: MEDICARE

## 2022-12-16 VITALS
TEMPERATURE: 96.9 F | WEIGHT: 184.6 LBS | RESPIRATION RATE: 18 BRPM | SYSTOLIC BLOOD PRESSURE: 187 MMHG | BODY MASS INDEX: 31.69 KG/M2 | OXYGEN SATURATION: 96 % | HEART RATE: 73 BPM | DIASTOLIC BLOOD PRESSURE: 102 MMHG

## 2022-12-16 PROCEDURE — 99212 OFFICE O/P EST SF 10 MIN: CPT | Performed by: RADIOLOGY

## 2022-12-16 PROCEDURE — 99214 OFFICE O/P EST MOD 30 MIN: CPT | Performed by: RADIOLOGY

## 2022-12-16 NOTE — PROGRESS NOTES
NURSING ASSESSMENT     Date: 12/16/2022        Patient Name: Kyle Hansen     YOB: 1940      Age:  80 y.o. MRN: [id number]       Chaperone [] Yes   [x] No      Advance Directives:  Do you currently have completed advance directives (living will)? [x] Yes   [] No         *If yes, please bring us a copy for your records. *If no, would you like info or assistance in completing advance directives (living will)? [] Yes   [x] No    Pain Score:   Pain Score (1-10): none      Is pain affecting your ability to take care of yourself or move throughout your home? [] Yes   [x] No    General: No Problems  Patient has gained weight [] Yes   [x] No  Patient has lost weight [] Yes   [x] No  How much weight in pounds and over what length of time:     Eyes (Ophthalmic): No Problem     Skin (Dermatological): nails arent growing while taking herceptin     ENT: No Problems     Respiratory: No Problems     Cardiovascular: No Problems      Device   [] Yes   [x] No   Copy of Card Obtained [] Yes   [x] No    Gastrointestinal: No Problems    Genito-Urinary: No Problems     Breast: No Problems     Musculoskeletal:  has arthritis but baseline    Neurological: No Problems      Hematological and Lymphatic: Easy Bruising     Endocrine: No Problems     Gyn History: no problems      A 10-point review of systems  has been conducted and pertinent positives have been   recorded. All other review of systems are negative    Was the patient admitted during the course of treatment OR within 30 days of treatment?  no    Additional Comments: will have herceptin next week and last dose in Jan 2023

## 2022-12-21 PROBLEM — Z12.31 ENCOUNTER FOR SCREENING MAMMOGRAM FOR BREAST CANCER: Status: RESOLVED | Noted: 2022-11-21 | Resolved: 2022-12-21

## 2023-01-11 RX ORDER — TOLTERODINE 4 MG/1
4 CAPSULE, EXTENDED RELEASE ORAL DAILY
Qty: 30 CAPSULE | Refills: 6 | Status: SHIPPED | OUTPATIENT
Start: 2023-01-11

## 2023-01-11 NOTE — TELEPHONE ENCOUNTER
Requested Prescriptions     Pending Prescriptions Disp Refills    tolterodine (DETROL LA) 4 MG extended release capsule 30 capsule 6     Sig: Take 1 capsule by mouth daily       Please send to [facility] in [place], rx was originally sent to the wrong pharmacy

## 2023-05-22 ENCOUNTER — OFFICE VISIT (OUTPATIENT)
Dept: SURGERY | Age: 83
End: 2023-05-22
Payer: COMMERCIAL

## 2023-05-22 VITALS
RESPIRATION RATE: 16 BRPM | HEIGHT: 64 IN | BODY MASS INDEX: 31.91 KG/M2 | TEMPERATURE: 97.3 F | OXYGEN SATURATION: 98 % | HEART RATE: 83 BPM | WEIGHT: 186.9 LBS | SYSTOLIC BLOOD PRESSURE: 160 MMHG | DIASTOLIC BLOOD PRESSURE: 98 MMHG

## 2023-05-22 DIAGNOSIS — Z17.1 CARCINOMA OF UPPER-OUTER QUADRANT OF RIGHT BREAST IN FEMALE, ESTROGEN RECEPTOR NEGATIVE (HCC): Primary | ICD-10-CM

## 2023-05-22 DIAGNOSIS — C50.411 CARCINOMA OF UPPER-OUTER QUADRANT OF RIGHT BREAST IN FEMALE, ESTROGEN RECEPTOR NEGATIVE (HCC): Primary | ICD-10-CM

## 2023-05-22 DIAGNOSIS — E66.9 OBESITY, CLASS I, BMI 30-34.9: ICD-10-CM

## 2023-05-22 PROCEDURE — 99214 OFFICE O/P EST MOD 30 MIN: CPT | Performed by: SURGERY

## 2023-05-22 PROCEDURE — G8399 PT W/DXA RESULTS DOCUMENT: HCPCS | Performed by: SURGERY

## 2023-05-22 PROCEDURE — G8427 DOCREV CUR MEDS BY ELIG CLIN: HCPCS | Performed by: SURGERY

## 2023-05-22 PROCEDURE — G8417 CALC BMI ABV UP PARAM F/U: HCPCS | Performed by: SURGERY

## 2023-05-22 PROCEDURE — 1090F PRES/ABSN URINE INCON ASSESS: CPT | Performed by: SURGERY

## 2023-05-22 PROCEDURE — 1036F TOBACCO NON-USER: CPT | Performed by: SURGERY

## 2023-05-22 PROCEDURE — 1123F ACP DISCUSS/DSCN MKR DOCD: CPT | Performed by: SURGERY

## 2023-05-22 ASSESSMENT — ENCOUNTER SYMPTOMS
ABDOMINAL PAIN: 0
SHORTNESS OF BREATH: 0
CHEST TIGHTNESS: 0
COUGH: 0
NAUSEA: 0
COLOR CHANGE: 0
SORE THROAT: 0
VOMITING: 0

## 2023-05-22 NOTE — PROGRESS NOTES
PATIENT:    Rafia Moore     DATE:      5/22/2023     TIME: 11:09 AM     Subjective:     Rafia Moore presents for follow-up of breast cancer. She is well and no complaints. The breast cancer is Cancer Staging   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, estrogen receptor negative ([facility] [address]  Staging form: Breast, AJCC 8th Edition  - Clinical stage from 10/12/2021: Stage IA (cT1, cN0, cM0, G3, ER-, MT-, HER2+) - Signed by Santana Almaraz MD on 11/18/2021       Patient's medications, allergies, past medical, surgical, social and family histories were reviewed and updated as appropriate. Current Outpatient Medications on File Prior to Visit   Medication Sig Dispense Refill    tolterodine (DETROL LA) 4 MG extended release capsule Take 1 capsule by mouth daily 30 capsule 6    candesartan-hydrochlorothiazide (ATACAND HCT) 32-12.5 MG per tablet        Current Facility-Administered Medications on File Prior to Visit   Medication Dose Route Frequency Provider Last Rate Last Admin    lidocaine 1 % injection 10 mL  10 mL IntraDERmal Once Santana Almaraz MD        sodium bicarbonate 8.4 % injection 3 mEq  3 mL IntraVENous Once Santana Almaraz MD            Any over the counter meds / herbal supplements / vitamins: yes    Review of Systems  Review of Systems   Constitutional:  Negative for activity change, appetite change, chills, diaphoresis, fatigue, fever and unexpected weight change. HENT:  Negative for congestion, ear pain, hearing loss, mouth sores, nosebleeds and sore throat. Respiratory:  Negative for cough, chest tightness and shortness of breath. Cardiovascular:  Negative for chest pain, palpitations and leg swelling. Gastrointestinal:  Negative for abdominal pain, nausea and vomiting. Endocrine: Negative for cold intolerance, heat intolerance, polydipsia, polyphagia and polyuria. Genitourinary:  Negative for difficulty urinating, menstrual problem and vaginal bleeding.    Musculoskeletal:

## 2023-05-22 NOTE — PROGRESS NOTES
Reason for today's visit:  6 month follow up right breast cancer follow up    Palpates a breast change? no  Nipple discharge: no  Breast Pain: yes - intermittent right breast sharp pain  Breast Mass: no  Swelling in either upper extremity? no    Wellness:    Self breast self exams: yes   Regular exercise routine: yes   Following Lymphedema awareness/precautions: yes   Managing stress:yes  Stress level on a scale of 1 - 10: 5    Instruction:    Follow up per provider  Imaging per provider  Monthly self breast exams  Healthy diet  Exercise program  Lymphedema precautions/awareness    Other:    Requisitions needed:no  Bras/prosthesis: no  Compression Sleeve/glove: no  Lymphedema Measurements: see flow sheet  Therapy: no  PT/OT/Lymphedema: no  Follow up as advised per Dr Demond Soriano    Lymphedema Screening 11/21/2022 5/16/2022   RIGHT 10 cm (Hand) 20.5 20.5   RIGHT 20 cm (Wrist) 16.5 16   RIGHT 30 cm (Forearm) 25 21   RIGHT 60 cm (Upper Arm) 32 32.5

## 2023-06-14 ENCOUNTER — APPOINTMENT (OUTPATIENT)
Dept: RADIATION ONCOLOGY | Age: 83
End: 2023-06-14
Attending: RADIOLOGY
Payer: COMMERCIAL

## 2023-06-21 ENCOUNTER — HOSPITAL ENCOUNTER (OUTPATIENT)
Dept: RADIATION ONCOLOGY | Age: 83
Discharge: HOME | End: 2023-06-21
Attending: RADIOLOGY
Payer: COMMERCIAL

## 2023-06-21 VITALS
TEMPERATURE: 97.3 F | RESPIRATION RATE: 18 BRPM | OXYGEN SATURATION: 98 % | DIASTOLIC BLOOD PRESSURE: 83 MMHG | SYSTOLIC BLOOD PRESSURE: 159 MMHG | WEIGHT: 182.4 LBS | BODY MASS INDEX: 31.31 KG/M2 | HEART RATE: 79 BPM

## 2023-06-21 DIAGNOSIS — C50.411 CARCINOMA OF UPPER-OUTER QUADRANT OF RIGHT BREAST IN FEMALE, ESTROGEN RECEPTOR NEGATIVE (HCC): Primary | ICD-10-CM

## 2023-06-21 DIAGNOSIS — Z17.1 CARCINOMA OF UPPER-OUTER QUADRANT OF RIGHT BREAST IN FEMALE, ESTROGEN RECEPTOR NEGATIVE (HCC): Primary | ICD-10-CM

## 2023-06-21 PROCEDURE — 99214 OFFICE O/P EST MOD 30 MIN: CPT | Performed by: RADIOLOGY

## 2023-06-21 PROCEDURE — 99212 OFFICE O/P EST SF 10 MIN: CPT | Performed by: RADIOLOGY

## 2023-06-21 NOTE — PROGRESS NOTES
NURSING ASSESSMENT     Date: 6/21/2023        Patient Name: Yo Agrawal     YOB: 1940      Age:  [de-identified] y.o. MRN: [id number]       Chaperone [] Yes   [x] No      Advance Directives:   Do you currently have completed advance directives (living will)? [x] Yes   [] No         *If yes, please bring us a copy for your records. *If no, would you like info or assistance in completing advance directives (living will)? [] Yes   [x] No    Pain Score:   Pain Score (1-10): Rare twinge to right breast   Pain Location: right breast   Pain Duration: fleeting   Pain Management/Control: NA      Is pain affecting your ability to take care of yourself or move throughout your home? [] Yes   [x] No    General: No Problems  Patient has gained weight [] Yes   [x] No  Patient has lost weight [x] Yes   [] No  How much weight in pounds and over what length of time: Down 2.2 lbs since last visit on 12/16/23    Eyes (Ophthalmic): No Problem     Skin (Dermatological): No Problems     ENT: No Problems     Respiratory: No Problems     Cardiovascular: No Problems      Device   [] Yes   [x] No   Copy of Card Obtained [] Yes   [x] No    Gastrointestinal: No Problems    Genito-Urinary:    Leaky bladder on occasion, mostly with physical activity. Takes Detrol as needed    Breast: s/p Right breast RT 5/18-6/15/22     Musculoskeletal:  Baseline arthritis to right hand    Neurological: No Problems      Hematological and Lymphatic: No Problems     Endocrine: No Problems     Gyn History:   No issues    A 10-point review of systems  has been conducted and pertinent positives have been   recorded. All other review of systems are negative    Was the patient admitted during the course of treatment OR within 30 days of treatment?   No      Additional Comments: next mammo 11/17/23, f/u appt with Dr. Francisco Middleton 11/27/23, and f/u appt with Dr. Charlie Alberto 7/27/23

## 2023-08-19 NOTE — PROGRESS NOTES
[address] [facility]           Radiation Oncology      [address], [address] [facility]        Yana Macdonaldnut: [phone]        F: [phone]       [facility]                   Dr. Nicole Valenzuela MD PhD    FOLLOW-UP NOTE     Date of Service: 2023  Patient ID: Vertis Meigs   : 1940  MRN: [id number]   Acct Number: [de-identified]       NAME:  Vertis Meigs    :  1940 82 y.o. female     PCP: Faustino Villatoro MD    REFERRING PROVIDER: Bhargav Edwards    DIAGNOSIS:  1. 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, estrogen receptor negative ([address])        STAGING: Cancer Staging   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, estrogen receptor negative ([address])  Staging form: Breast, AJCC 8th Edition  - Clinical stage from 10/12/2021: Stage IA (cT1, cN0, cM0, G3, ER-, NM-, HER2+) - Signed by Bob Colorado MD on 2021      PRIOR TREATMENT: 4256 cGy to the whole right breast with a boost to 5250 cGy to the lumpectomy cavity     TIME SINCE TREATMENT: 12 months months     RECENT HISTORY: Vertis Meigs returns for follow up status post completion of adjuvant radiation therapy for the above diagnosis. All acute sequelae of radiation therapy have resolved and her skin has well-healed. She completed Trazimera on 1/10/2023. She did have a bilateral mammogram on 2022 which was read as BI-RADS 2, benign. She has been seen by breast surgery and medical oncology in follow-up with no complaints on that visit. She does note some improvement in appetite, energy, and overall wellbeing all of which are reassuring. Past medical, surgical, social and family histories reviewed and updated as indicated.     ALLERGIES:  Atorvastatin, Clonidine, Fenofibrate micronized, Levofloxacin, Raloxifene, and Statins       MEDICATIONS:   Current Outpatient Medications:     tolterodine (DETROL LA) 4 MG extended release capsule, Take 1 capsule by mouth daily, Disp: 30 capsule,

## 2023-11-17 ENCOUNTER — HOSPITAL ENCOUNTER (OUTPATIENT)
Dept: WOMENS IMAGING | Age: 83
Discharge: HOME | End: 2023-11-17
Payer: COMMERCIAL

## 2023-11-17 VITALS — BODY MASS INDEX: 31.29 KG/M2 | HEIGHT: 64 IN

## 2023-11-17 DIAGNOSIS — Z12.31 ENCOUNTER FOR SCREENING MAMMOGRAM FOR BREAST CANCER: ICD-10-CM

## 2023-11-17 PROCEDURE — 77067 SCR MAMMO BI INCL CAD: CPT

## 2024-02-08 NOTE — PROGRESS NOTES
candesartan-hydrochlorothiazide (ATACAND HCT) 32-12.5 MG per tablet  1/30/19  Yes Provider, Historical, MD     Allergies   Allergen Reactions    Atorvastatin      Muscle pain    Clonidine      SKIN RX.    Fenofibrate Micronized      Muscle pain    Levofloxacin      Other reaction(s): Other: See Comments  ? tendonitis    Raloxifene      EVISTA - HOT FLASHES, MOOD SWINGS    Statins Other (See Comments)     muscle pain     Family History   Problem Relation Age of Onset    Cancer Father     Diabetes Father     Right Breast Cancer Sister 71     Social History     Tobacco Use    Smoking status: Former    Smokeless tobacco: Never    Tobacco comments:     Quit 45 years ago as of 11/22/21   Vaping Use    Vaping Use: Never used   Substance Use Topics    Alcohol use: No    Drug use: No     Review of Systems:   General: Denies any fevers, chills, night sweats, appetite changes, fatigue, and unintentional weight loss  Skin: Denies any itching, rash, abscess, and bulges  HENT: Denies any headaches, blurry vision, sinus pressure, post nasal drip, rhinorrhea, sore throat, dysphagia  Resp: Denies any shortness of breath, dyspnea on exertion, coughing, wheezing  Cardiac: Denies any chest pain, palpitations, lower extremity edema  GI: Denies any nausea, vomiting, heart burn, acid reflux, abdominal pain, diarrhea, constipation, melena/ hematochezia  : Denies any dysuria, hesitancy, incomplete emptying, blood in the urine  ++ incontinence  Heme: Denies easy bleeding, swollen lymph nodes, anemia  ++easy bruising  Endocrine: Denies heat intolerance, cold intolerance, excessive thirst  MSK: Denies back pain, trouble walking  ++joint pain  Neuro: Denies dizziness, tremors, seizures, light headedness, syncope, numbness, weakness    OBJECTIVE:   CURRENT VITALS: /84   Pulse 87   Temp 97.3 °F (36.3 °C) (Temporal)   Ht 1.613 m (5' 3.5\")   Wt 86.3 kg (190 lb 3.2 oz)   SpO2 96%   BMI 33.16 kg/m²      GEN: Alert and oriented x3, no

## 2024-02-09 ENCOUNTER — OFFICE VISIT (OUTPATIENT)
Dept: SURGERY | Age: 84
End: 2024-02-09
Payer: COMMERCIAL

## 2024-02-09 VITALS
WEIGHT: 190.2 LBS | TEMPERATURE: 97.3 F | DIASTOLIC BLOOD PRESSURE: 84 MMHG | OXYGEN SATURATION: 96 % | HEIGHT: 64 IN | BODY MASS INDEX: 32.47 KG/M2 | SYSTOLIC BLOOD PRESSURE: 122 MMHG | HEART RATE: 87 BPM

## 2024-02-09 DIAGNOSIS — Z45.2 ENCOUNTER FOR REMOVAL OF TUNNELED CENTRAL VENOUS CATHETER (CVC) WITH PORT: ICD-10-CM

## 2024-02-09 PROCEDURE — 99213 OFFICE O/P EST LOW 20 MIN: CPT | Performed by: SURGERY

## 2024-02-09 PROCEDURE — 1123F ACP DISCUSS/DSCN MKR DOCD: CPT | Performed by: SURGERY

## 2024-02-09 RX ORDER — SODIUM CHLORIDE 0.9 % (FLUSH) 0.9 %
5-40 SYRINGE (ML) INJECTION PRN
OUTPATIENT
Start: 2024-02-09

## 2024-02-09 RX ORDER — SODIUM CHLORIDE 0.9 % (FLUSH) 0.9 %
5-40 SYRINGE (ML) INJECTION EVERY 12 HOURS SCHEDULED
OUTPATIENT
Start: 2024-02-09

## 2024-02-09 RX ORDER — SODIUM CHLORIDE 9 MG/ML
INJECTION, SOLUTION INTRAVENOUS PRN
OUTPATIENT
Start: 2024-02-09

## 2024-02-20 ENCOUNTER — OFFICE VISIT (OUTPATIENT)
Dept: SURGERY | Age: 84
End: 2024-02-20
Payer: COMMERCIAL

## 2024-02-20 VITALS
SYSTOLIC BLOOD PRESSURE: 197 MMHG | WEIGHT: 191 LBS | TEMPERATURE: 97.3 F | HEART RATE: 85 BPM | HEIGHT: 63 IN | DIASTOLIC BLOOD PRESSURE: 91 MMHG | RESPIRATION RATE: 13 BRPM | OXYGEN SATURATION: 99 % | BODY MASS INDEX: 33.84 KG/M2

## 2024-02-20 DIAGNOSIS — C50.411 CARCINOMA OF UPPER-OUTER QUADRANT OF RIGHT BREAST IN FEMALE, ESTROGEN RECEPTOR NEGATIVE (HCC): Primary | ICD-10-CM

## 2024-02-20 DIAGNOSIS — Z17.1 CARCINOMA OF UPPER-OUTER QUADRANT OF RIGHT BREAST IN FEMALE, ESTROGEN RECEPTOR NEGATIVE (HCC): Primary | ICD-10-CM

## 2024-02-20 DIAGNOSIS — E66.9 OBESITY, CLASS I, BMI 30-34.9: ICD-10-CM

## 2024-02-20 DIAGNOSIS — Z12.31 ENCOUNTER FOR SCREENING MAMMOGRAM FOR BREAST CANCER: ICD-10-CM

## 2024-02-20 PROCEDURE — 99214 OFFICE O/P EST MOD 30 MIN: CPT | Performed by: SURGERY

## 2024-02-20 PROCEDURE — 1090F PRES/ABSN URINE INCON ASSESS: CPT | Performed by: SURGERY

## 2024-02-20 PROCEDURE — G8427 DOCREV CUR MEDS BY ELIG CLIN: HCPCS | Performed by: SURGERY

## 2024-02-20 PROCEDURE — G8417 CALC BMI ABV UP PARAM F/U: HCPCS | Performed by: SURGERY

## 2024-02-20 PROCEDURE — G8484 FLU IMMUNIZE NO ADMIN: HCPCS | Performed by: SURGERY

## 2024-02-20 PROCEDURE — 1036F TOBACCO NON-USER: CPT | Performed by: SURGERY

## 2024-02-20 PROCEDURE — G8399 PT W/DXA RESULTS DOCUMENT: HCPCS | Performed by: SURGERY

## 2024-02-20 PROCEDURE — 1123F ACP DISCUSS/DSCN MKR DOCD: CPT | Performed by: SURGERY

## 2024-02-20 ASSESSMENT — ENCOUNTER SYMPTOMS
COUGH: 0
CHEST TIGHTNESS: 0
ABDOMINAL PAIN: 0
COLOR CHANGE: 0
SORE THROAT: 0
VOMITING: 0
NAUSEA: 0
SHORTNESS OF BREATH: 0

## 2024-02-20 NOTE — PROGRESS NOTES
PATIENT:    Maritza Sandoval     DATE:      2/20/2024     TIME: 1:39 PM     Subjective:     Maritza Sandoval presents for follow-up of breast cancer. She is well. She has no complaints    The breast cancer is Cancer Staging   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, estrogen receptor negative ([facility])  Staging form: Breast, AJCC 8th Edition  - Clinical stage from 10/12/2021: Stage IA (cT1, cN0, cM0, G3, ER-, SD-, HER2+) - Signed by Trinidad Pardo MD on 11/18/2021       She does not perform self breast exams.  She denies breast pain, masses, skin changes, nipple discharge, nipple retraction, or recent breast trauma bilaterally.      Patient's medications, allergies, past medical, surgical, social and family histories were reviewed and updated as appropriate.    Current Outpatient Medications on File Prior to Visit   Medication Sig Dispense Refill    tolterodine (DETROL LA) 4 MG extended release capsule Take 1 capsule by mouth daily 30 capsule 6    candesartan-hydrochlorothiazide (ATACAND HCT) 32-12.5 MG per tablet        Current Facility-Administered Medications on File Prior to Visit   Medication Dose Route Frequency Provider Last Rate Last Admin    lidocaine 1 % injection 10 mL  10 mL IntraDERmal Once Trinidad Pardo MD        sodium bicarbonate 8.4 % injection 3 mEq  3 mL IntraVENous Once Trinidad Pardo MD            Any over the counter meds / herbal supplements / vitamins: yes    Review of Systems  Review of Systems   Constitutional:  Negative for activity change, appetite change, chills, diaphoresis, fatigue, fever and unexpected weight change.   HENT:  Negative for congestion, ear pain, hearing loss, mouth sores, nosebleeds and sore throat.    Respiratory:  Negative for cough, chest tightness and shortness of breath.    Cardiovascular:  Negative for chest pain, palpitations and leg swelling.   Gastrointestinal:  Negative for abdominal pain, nausea and vomiting.   Endocrine: Negative for cold

## 2024-02-26 ENCOUNTER — ANESTHESIA EVENT (OUTPATIENT)
Dept: OPERATING ROOM | Age: 84
End: 2024-02-26
Payer: COMMERCIAL

## 2024-02-27 ENCOUNTER — ANESTHESIA (OUTPATIENT)
Dept: OPERATING ROOM | Age: 84
End: 2024-02-27
Payer: COMMERCIAL

## 2024-02-27 ENCOUNTER — HOSPITAL ENCOUNTER (OUTPATIENT)
Age: 84
Setting detail: OUTPATIENT SURGERY
Discharge: HOME | End: 2024-02-27
Attending: SURGERY | Admitting: SURGERY
Payer: COMMERCIAL

## 2024-02-27 VITALS
OXYGEN SATURATION: 96 % | BODY MASS INDEX: 32.44 KG/M2 | HEART RATE: 78 BPM | RESPIRATION RATE: 16 BRPM | WEIGHT: 190 LBS | DIASTOLIC BLOOD PRESSURE: 76 MMHG | HEIGHT: 64 IN | TEMPERATURE: 97.6 F | SYSTOLIC BLOOD PRESSURE: 148 MMHG

## 2024-02-27 DIAGNOSIS — Z45.2 ENCOUNTER FOR REMOVAL OF TUNNELED CENTRAL VENOUS CATHETER (CVC) WITH PORT: ICD-10-CM

## 2024-02-27 LAB
ANION GAP SERPL CALCULATED.3IONS-SCNC: 12 MEQ/L (ref 9–15)
BUN SERPL-MCNC: 21 MG/DL (ref 8–23)
CALCIUM SERPL-MCNC: 9.4 MG/DL (ref 8.5–9.9)
CHLORIDE SERPL-SCNC: 101 MEQ/L (ref 95–107)
CO2 SERPL-SCNC: 25 MEQ/L (ref 20–31)
CREAT SERPL-MCNC: 0.89 MG/DL (ref 0.5–0.9)
ERYTHROCYTE [DISTWIDTH] IN BLOOD BY AUTOMATED COUNT: 12.2 % (ref 11.5–14.5)
GLUCOSE SERPL-MCNC: 137 MG/DL (ref 70–99)
HCT VFR BLD AUTO: 42.9 % (ref 37–47)
HGB BLD-MCNC: 13.9 G/DL (ref 12–16)
MCH RBC QN AUTO: 28.7 PG (ref 27–31.3)
MCHC RBC AUTO-ENTMCNC: 32.4 % (ref 33–37)
MCV RBC AUTO: 88.6 FL (ref 79.4–94.8)
PLATELET # BLD AUTO: 216 K/UL (ref 130–400)
POTASSIUM SERPL-SCNC: 3.9 MEQ/L (ref 3.4–4.9)
RBC # BLD AUTO: 4.84 M/UL (ref 4.2–5.4)
SODIUM SERPL-SCNC: 138 MEQ/L (ref 135–144)
WBC # BLD AUTO: 7.1 K/UL (ref 4.8–10.8)

## 2024-02-27 PROCEDURE — 36590 REMOVAL TUNNELED CV CATH: CPT | Performed by: SURGERY

## 2024-02-27 PROCEDURE — A4217 STERILE WATER/SALINE, 500 ML: HCPCS | Performed by: SURGERY

## 2024-02-27 PROCEDURE — 2580000003 HC RX 258: Performed by: SURGERY

## 2024-02-27 PROCEDURE — 2500000003 HC RX 250 WO HCPCS: Performed by: SURGERY

## 2024-02-27 PROCEDURE — 2580000003 HC RX 258: Performed by: ANESTHESIOLOGY

## 2024-02-27 PROCEDURE — 85027 COMPLETE CBC AUTOMATED: CPT

## 2024-02-27 PROCEDURE — 3600000014 HC SURGERY LEVEL 4 ADDTL 15MIN: Performed by: SURGERY

## 2024-02-27 PROCEDURE — 7100000000 HC PACU RECOVERY - FIRST 15 MIN: Performed by: SURGERY

## 2024-02-27 PROCEDURE — 2709999900 HC NON-CHARGEABLE SUPPLY: Performed by: SURGERY

## 2024-02-27 PROCEDURE — 6370000000 HC RX 637 (ALT 250 FOR IP): Performed by: ANESTHESIOLOGY

## 2024-02-27 PROCEDURE — 6360000002 HC RX W HCPCS: Performed by: SURGERY

## 2024-02-27 PROCEDURE — 80048 BASIC METABOLIC PNL TOTAL CA: CPT

## 2024-02-27 PROCEDURE — 7100000010 HC PHASE II RECOVERY - FIRST 15 MIN: Performed by: SURGERY

## 2024-02-27 PROCEDURE — 3600000004 HC SURGERY LEVEL 4 BASE: Performed by: SURGERY

## 2024-02-27 PROCEDURE — 6360000002 HC RX W HCPCS

## 2024-02-27 PROCEDURE — 3700000000 HC ANESTHESIA ATTENDED CARE: Performed by: SURGERY

## 2024-02-27 PROCEDURE — 3700000001 HC ADD 15 MINUTES (ANESTHESIA): Performed by: SURGERY

## 2024-02-27 PROCEDURE — 88300 SURGICAL PATH GROSS: CPT

## 2024-02-27 PROCEDURE — 2500000003 HC RX 250 WO HCPCS

## 2024-02-27 PROCEDURE — 7100000001 HC PACU RECOVERY - ADDTL 15 MIN: Performed by: SURGERY

## 2024-02-27 RX ORDER — DIPHENHYDRAMINE HYDROCHLORIDE 50 MG/ML
12.5 INJECTION INTRAMUSCULAR; INTRAVENOUS
Status: DISCONTINUED | OUTPATIENT
Start: 2024-02-27 | End: 2024-02-27 | Stop reason: HOSPADM

## 2024-02-27 RX ORDER — SODIUM CHLORIDE 9 MG/ML
INJECTION, SOLUTION INTRAVENOUS PRN
Status: DISCONTINUED | OUTPATIENT
Start: 2024-02-27 | End: 2024-02-27 | Stop reason: HOSPADM

## 2024-02-27 RX ORDER — LIDOCAINE HYDROCHLORIDE 10 MG/ML
INJECTION, SOLUTION EPIDURAL; INFILTRATION; INTRACAUDAL; PERINEURAL PRN
Status: DISCONTINUED | OUTPATIENT
Start: 2024-02-27 | End: 2024-02-27 | Stop reason: HOSPADM

## 2024-02-27 RX ORDER — ONDANSETRON 2 MG/ML
4 INJECTION INTRAMUSCULAR; INTRAVENOUS
Status: DISCONTINUED | OUTPATIENT
Start: 2024-02-27 | End: 2024-02-27 | Stop reason: HOSPADM

## 2024-02-27 RX ORDER — LIDOCAINE HYDROCHLORIDE 10 MG/ML
INJECTION, SOLUTION EPIDURAL; INFILTRATION; INTRACAUDAL; PERINEURAL PRN
Status: DISCONTINUED | OUTPATIENT
Start: 2024-02-27 | End: 2024-02-27 | Stop reason: SDUPTHER

## 2024-02-27 RX ORDER — SODIUM CHLORIDE 0.9 % (FLUSH) 0.9 %
5-40 SYRINGE (ML) INJECTION EVERY 12 HOURS SCHEDULED
Status: DISCONTINUED | OUTPATIENT
Start: 2024-02-27 | End: 2024-02-27 | Stop reason: HOSPADM

## 2024-02-27 RX ORDER — SODIUM CHLORIDE 0.9 % (FLUSH) 0.9 %
5-40 SYRINGE (ML) INJECTION PRN
Status: DISCONTINUED | OUTPATIENT
Start: 2024-02-27 | End: 2024-02-27 | Stop reason: HOSPADM

## 2024-02-27 RX ORDER — OXYCODONE HYDROCHLORIDE 5 MG/1
5 TABLET ORAL
Status: COMPLETED | OUTPATIENT
Start: 2024-02-27 | End: 2024-02-27

## 2024-02-27 RX ORDER — LIDOCAINE HYDROCHLORIDE 10 MG/ML
1 INJECTION, SOLUTION EPIDURAL; INFILTRATION; INTRACAUDAL; PERINEURAL
Status: DISCONTINUED | OUTPATIENT
Start: 2024-02-27 | End: 2024-02-27 | Stop reason: HOSPADM

## 2024-02-27 RX ORDER — SODIUM CHLORIDE, SODIUM LACTATE, POTASSIUM CHLORIDE, CALCIUM CHLORIDE 600; 310; 30; 20 MG/100ML; MG/100ML; MG/100ML; MG/100ML
INJECTION, SOLUTION INTRAVENOUS CONTINUOUS
Status: DISCONTINUED | OUTPATIENT
Start: 2024-02-27 | End: 2024-02-27 | Stop reason: HOSPADM

## 2024-02-27 RX ORDER — MAGNESIUM HYDROXIDE 1200 MG/15ML
LIQUID ORAL CONTINUOUS PRN
Status: DISCONTINUED | OUTPATIENT
Start: 2024-02-27 | End: 2024-02-27 | Stop reason: HOSPADM

## 2024-02-27 RX ORDER — PROPOFOL 10 MG/ML
INJECTION, EMULSION INTRAVENOUS PRN
Status: DISCONTINUED | OUTPATIENT
Start: 2024-02-27 | End: 2024-02-27 | Stop reason: SDUPTHER

## 2024-02-27 RX ORDER — METOCLOPRAMIDE HYDROCHLORIDE 5 MG/ML
10 INJECTION INTRAMUSCULAR; INTRAVENOUS
Status: DISCONTINUED | OUTPATIENT
Start: 2024-02-27 | End: 2024-02-27 | Stop reason: HOSPADM

## 2024-02-27 RX ORDER — MIDAZOLAM HYDROCHLORIDE 1 MG/ML
INJECTION INTRAMUSCULAR; INTRAVENOUS PRN
Status: DISCONTINUED | OUTPATIENT
Start: 2024-02-27 | End: 2024-02-27 | Stop reason: SDUPTHER

## 2024-02-27 RX ORDER — FENTANYL CITRATE 0.05 MG/ML
50 INJECTION, SOLUTION INTRAMUSCULAR; INTRAVENOUS EVERY 10 MIN PRN
Status: DISCONTINUED | OUTPATIENT
Start: 2024-02-27 | End: 2024-02-27 | Stop reason: HOSPADM

## 2024-02-27 RX ORDER — ONDANSETRON 2 MG/ML
INJECTION INTRAMUSCULAR; INTRAVENOUS PRN
Status: DISCONTINUED | OUTPATIENT
Start: 2024-02-27 | End: 2024-02-27 | Stop reason: SDUPTHER

## 2024-02-27 RX ADMIN — CEFAZOLIN 2000 MG: 2 INJECTION, POWDER, FOR SOLUTION INTRAMUSCULAR; INTRAVENOUS at 07:40

## 2024-02-27 RX ADMIN — PROPOFOL 80 MCG/KG/MIN: 10 INJECTION, EMULSION INTRAVENOUS at 07:38

## 2024-02-27 RX ADMIN — OXYCODONE 5 MG: 5 TABLET ORAL at 09:09

## 2024-02-27 RX ADMIN — MIDAZOLAM HYDROCHLORIDE 2 MG: 1 INJECTION, SOLUTION INTRAMUSCULAR; INTRAVENOUS at 07:30

## 2024-02-27 RX ADMIN — SODIUM CHLORIDE, POTASSIUM CHLORIDE, SODIUM LACTATE AND CALCIUM CHLORIDE: 600; 310; 30; 20 INJECTION, SOLUTION INTRAVENOUS at 06:34

## 2024-02-27 RX ADMIN — LIDOCAINE HYDROCHLORIDE 40 MG: 10 INJECTION, SOLUTION EPIDURAL; INFILTRATION; INTRACAUDAL; PERINEURAL at 07:39

## 2024-02-27 RX ADMIN — ONDANSETRON 4 MG: 2 INJECTION INTRAMUSCULAR; INTRAVENOUS at 07:44

## 2024-02-27 RX ADMIN — PROPOFOL 30 MG: 10 INJECTION, EMULSION INTRAVENOUS at 07:39

## 2024-02-27 RX ADMIN — PROPOFOL 20 MG: 10 INJECTION, EMULSION INTRAVENOUS at 07:55

## 2024-02-27 ASSESSMENT — PAIN DESCRIPTION - LOCATION: LOCATION: CHEST

## 2024-02-27 ASSESSMENT — PAIN SCALES - GENERAL
PAINLEVEL_OUTOF10: 6
PAINLEVEL_OUTOF10: 0
PAINLEVEL_OUTOF10: 8
PAINLEVEL_OUTOF10: 0

## 2024-02-27 ASSESSMENT — PAIN - FUNCTIONAL ASSESSMENT: PAIN_FUNCTIONAL_ASSESSMENT: ACTIVITIES ARE NOT PREVENTED

## 2024-02-27 ASSESSMENT — PAIN DESCRIPTION - DESCRIPTORS: DESCRIPTORS: ACHING

## 2024-02-27 ASSESSMENT — PAIN DESCRIPTION - ORIENTATION: ORIENTATION: LEFT

## 2024-02-27 NOTE — ANESTHESIA POSTPROCEDURE EVALUATION
Department of Anesthesiology  Postprocedure Note    Patient: Maritza Sandoval  MRN: [id number]  YOB: 1940  Date of evaluation: 2/27/2024    Procedure Summary       Date: 02/27/24 Room / Location: [address]    Anesthesia Start: 0730 Anesthesia Stop: 0839    Procedure: Port removal (left chest wall) (Left) Diagnosis:       Encounter for removal of tunneled central venous catheter (CVC) with port      (Encounter for removal of tunneled central venous catheter (CVC) with port [Z45.2])    Surgeons: Tonya Cottrell MD Responsible Provider: Tyree Ortiz MD    Anesthesia Type: general ASA Status: 2            Anesthesia Type: No value filed.    Kellie Phase I:      Kellie Phase II:      Anesthesia Post Evaluation    Patient location during evaluation: bedside  Patient participation: complete - patient participated  Level of consciousness: awake and awake and alert  Airway patency: patent  Nausea & Vomiting: no nausea and no vomiting  Cardiovascular status: blood pressure returned to baseline and hemodynamically stable  Respiratory status: acceptable  Hydration status: euvolemic  Pain management: adequate        No notable events documented.

## 2024-02-27 NOTE — ANESTHESIA PRE PROCEDURE
Department of Anesthesiology  Preprocedure Note       Name:  Maritza Sandoval   Age:  83 y.o.  :  1940                                          MRN:  [id number]         Date:  2024      Surgeon: Surgeon(s):  Tonya Cottrell MD    Procedure: Procedure(s):  Port removal (left chest wall) PAT ON ADMIT, DR VALDOVINOS DO H&P    Medications prior to admission:   Prior to Admission medications    Medication Sig Start Date End Date Taking? Authorizing Provider   tolterodine (DETROL LA) 4 MG extended release capsule Take 1 capsule by mouth daily 23   Tammy Linares MD   candesartan-hydrochlorothiazide (ATACAND HCT) 32-12.5 MG per tablet  19   ProviderYesenia MD       Current medications:    Current Facility-Administered Medications   Medication Dose Route Frequency Provider Last Rate Last Admin    lidocaine PF 1 % injection 1 mL  1 mL IntraDERmal Once PRN Tyree Ortiz MD        lactated ringers IV soln infusion   IntraVENous Continuous Tyree Ortiz  mL/hr at 24 0634 New Bag at 24 0634    sodium chloride flush 0.9 % injection 5-40 mL  5-40 mL IntraVENous 2 times per day Tonya Cottrell MD        sodium chloride flush 0.9 % injection 5-40 mL  5-40 mL IntraVENous PRN Tonya Cottrell MD        0.9 % sodium chloride infusion   IntraVENous PRN Tonya Cottrell MD        ceFAZolin (ANCEF) 2,000 mg in sodium chloride 0.9 % 100 mL IVPB (mini-bag)  2,000 mg IntraVENous On Call to OR Tonya Cottrell MD           Allergies:    Allergies   Allergen Reactions    Atorvastatin      Muscle pain    Clonidine      SKIN RX.    Fenofibrate Micronized      Muscle pain    Levofloxacin      Other reaction(s): Other: See Comments  ? tendonitis    Raloxifene      EVISTA - HOT FLASHES, MOOD SWINGS    Statins Other (See Comments)     muscle pain       Problem List:    Patient Active Problem List   Diagnosis Code    Lumbosacral spondylosis without myelopathy M47.817    Facet syndrome

## 2024-02-27 NOTE — BRIEF OP NOTE
Brief Postoperative Note      Patient: Maritza Sandoval  YOB: 1940  MRN: [id number]    Date of Procedure: 2/27/2024  Procedure Start Time: 7:56am  Procedure End Time: 8:20am    Pre-Op Diagnosis: Attention to port  Post-Op Diagnosis: Same       Procedure: Left chest wall port removal    Surgeon: Tonya Cottrell MD   First Assistant: Nelsy Wagner    Anesthesia: MAC    Estimated Blood Loss (mL): Minimal    Complications: None    Specimens:   ID Type Source Tests Collected by Time Destination   A : left chest port Hardware Chest SURGICAL PATHOLOGY Tonya Cottrell MD 2/27/2024 0820      Implants: None      Drains: None    Findings: Port removed with catheter tip intact      Electronically signed by Tonya Cottrell MD on 2/27/2024 at 8:29 AM

## 2024-02-27 NOTE — OP NOTE
Operative Note        Patient: Maritza Sandoval  YOB: 1940  MRN: [id number]     Date of Procedure: 2/27/2024  Procedure Start Time: 7:56am  Procedure End Time: 8:20am     Pre-Op Diagnosis: Attention to port  Post-Op Diagnosis: Same       Procedure: Left chest wall port removal     Surgeon: Tonya Cottrell MD   First Assistant: Nelsy Wagner     Anesthesia: MAC     Estimated Blood Loss (mL): Minimal     Specimens: Left chest wall port    Description of Procedure:   The patient was brought to the operating room and placed supine on the operating room table. Anesthesia was administered with sedation. The patient was then positioned, prepped with ChloraPrep, and draped in the usual sterile fashion. A time-out was performed to verify patient, laterality, and procedure. A transverse incision was made over the prior incision site in the upper left chest. We dissected down through the subcutaneous tissue to the port and freed it from the surrounding tissue in its entirety. The catheter was freed up and removed from the vein with manual pressure held at the entry site for five minutes. A figure-of-eight 3-0 vicryl stitch was placed around where the catheter exit site was. The port was then removed with tip intact and sent to pathology for permanent. The wound was irrigated with saline and there was excellent hemostasis. The wound was closed in layers. Interrupted 3-0 vicryl fascial and deep dermal stitches were placed and the skin was closed with a running subcutaneous 4-0 monocryl. Surgical glue was applied. The procedure was concluded. The patient tolerated the procedure well, was awaken, and transferred to the PACU in stable condition. Sponge, tape, needle, and instrument counts were correct x2.      Drains: None    Complications: None      Electronically signed by Tonya Cottrell MD on 2/27/2024

## 2024-02-27 NOTE — H&P
UPDATED HISTORY AND PHYSICAL EXAMINATION    The History and Physical (completed in the past 30 days) has been reviewed and the patient has been examined. The contents accurately reflect the patient's condition with the following additions or revisions since the H&P was completed.    Examination indicates no changes.  This H&P can be found in the Electronic Medical Record by myself on 2/9/24.    SIGNATURE: Tonya Cottrell MD   DATE: 2/27/2024  TIME: 7:28 AM

## 2024-02-27 NOTE — DISCHARGE INSTRUCTIONS
The following is a brief overview of your hospitalization. Some of the information contained on this summary may be confidential.  This information should be kept in your records and should be shared with your regular doctor.    Admission Date: 2/27/2024  Discharge Date: 2/27/2024    Disposition:  Home    PRINCIPAL DIAGNOSIS (reason after study for this admission): Left chest wall port removal    Physicians:               Attending: Tonya Cottrell MD    Refer to After Visit Summary for Home Medications List  -After Visit Summary and medications reviewed with patient and family  -Please discard previous medication list and update your records with the new medication list, medication providers, or pharmacies    Procedures performed while hospitalized:   IV access  Venapuncture  Anesthesia/ Intubation (for surgery)    Operations performed while hospitalized:   Left chest wall port removal    Treatment/wound care: Keep incision clean and dry- they are covered with surgical glue (do not peel off the glue, it will fall off on its own when ready, usually in 1-2 weeks).  Ok to shower upon discharge.  Do not scrub wound vigorously. Pat incision(s) dry after showering.  Do not submerge your incision: No baths, jacuzzi/ hot tubes, or swimming for 3 weeks.    Pain Control:  Take tylenol or ibuprofen for pain relief- can take each every 6 hours. (Best to alternate the tylenol and ibuprofen every 3 hours: for example, if you take tylenol at 12pm, take ibuprofen at 3pm, then your next tylenol dose at 6pm). You should not exceed more than 4000mg of tylenol/acetaminophen from all sources during a 24 hours period. Use ice packs too- they really help!    Activity after Discharge: OK to resume normal activities.  Ambulation is encouraged.     Diet: Pre-hospital diet.     Follow-Up:  Please call [phone] as soon as possible to schedule a follow up appointment with Dr. Cottrell in clinic in 2 weeks.      Future Appointments   Date

## 2024-03-13 ENCOUNTER — OFFICE VISIT (OUTPATIENT)
Dept: SURGERY | Age: 84
End: 2024-03-13

## 2024-03-13 VITALS
TEMPERATURE: 97.1 F | BODY MASS INDEX: 33.56 KG/M2 | DIASTOLIC BLOOD PRESSURE: 80 MMHG | SYSTOLIC BLOOD PRESSURE: 134 MMHG | HEIGHT: 63 IN | WEIGHT: 189.4 LBS | HEART RATE: 85 BPM

## 2024-03-13 DIAGNOSIS — Z09 POSTOP CHECK: Primary | ICD-10-CM

## 2024-03-13 PROCEDURE — 99024 POSTOP FOLLOW-UP VISIT: CPT | Performed by: SURGERY

## 2024-03-21 NOTE — PROGRESS NOTES
GENERAL SURGERY POST OPERATIVE VISIT    Pt Name: Maritza Sandoval  MRN: [id number]  Date: 3/13/2024       SUBJECTIVE:   Maritza Sandoval is a 83 y.o. female who is now about 2 weeks s/p left chest wall port removal. Upon presentation, pt reports doing great. She had no issues after the surgery. Notes the incision still has some glue on it but is not causing her any pain. Denies incisional drainage, fevers, chills, and night sweats.     OBJECTIVE:   CURRENT VITALS: /80   Pulse 85   Temp 97.1 °F (36.2 °C)   Ht 1.6 m (5' 3\")   Wt 85.9 kg (189 lb 6.4 oz)   BMI 33.55 kg/m²      GEN: Alert and oriented x3, no acute distress, cooperative   SKIN: Skin color, texture, turgor normal. No rashes or lesions  HEENT: Head is normocephalic, atraumatic. EOMI  NECK: Supple, symmetrical, trachea midline, skin normal  PULM: Chest symmetric, no increased work of breathing or accessory muscle use  CV: Heart regular rate   CHEST WALL: left chest wall prior port site incision c/d/I without surrounding erythema, flat, nontender  EXTREMITIES: Warm, dry    PATHOLOGY:   FINAL DIAGNOSIS: REMOVAL OF LEFT CHEST WALL PORT: PORT (GROSS EXAMINATION)     ASSESSMENT AND PLAN:   Maritza Sandoval is a 83 y.o. female now about 2 weeks s/p left chest wall port removal. She is doing great with no post operative issues. Wound is healing well, no signs/ symptoms of infection.    Follow up on an as needed basis      I have spent 15 minutes reviewing previous notes, test results and face to face with the patient discussing the diagnosis and importance of compliance with the treatment plan as well as documenting on the day of the visit.    Tonya Cottrell MD   General surgeon    Electronically signed by Tonya Cottrell MD

## 2024-11-20 ENCOUNTER — HOSPITAL ENCOUNTER (OUTPATIENT)
Dept: WOMENS IMAGING | Age: 84
Discharge: HOME | End: 2024-11-22
Payer: MEDICARE

## 2024-11-20 VITALS — BODY MASS INDEX: 33.56 KG/M2 | HEIGHT: 63 IN

## 2024-11-20 DIAGNOSIS — Z12.31 ENCOUNTER FOR SCREENING MAMMOGRAM FOR BREAST CANCER: ICD-10-CM

## 2024-11-20 PROCEDURE — 77063 BREAST TOMOSYNTHESIS BI: CPT

## 2024-12-16 ENCOUNTER — OFFICE VISIT (OUTPATIENT)
Dept: SURGERY | Age: 84
End: 2024-12-16

## 2024-12-16 VITALS
BODY MASS INDEX: 32.64 KG/M2 | RESPIRATION RATE: 16 BRPM | OXYGEN SATURATION: 96 % | SYSTOLIC BLOOD PRESSURE: 158 MMHG | WEIGHT: 184.2 LBS | DIASTOLIC BLOOD PRESSURE: 86 MMHG | TEMPERATURE: 97.8 F | HEART RATE: 84 BPM | HEIGHT: 63 IN

## 2024-12-16 DIAGNOSIS — Z17.1 CARCINOMA OF UPPER-OUTER QUADRANT OF RIGHT BREAST IN FEMALE, ESTROGEN RECEPTOR NEGATIVE (HCC): Primary | ICD-10-CM

## 2024-12-16 DIAGNOSIS — C50.411 CARCINOMA OF UPPER-OUTER QUADRANT OF RIGHT BREAST IN FEMALE, ESTROGEN RECEPTOR NEGATIVE (HCC): Primary | ICD-10-CM

## 2024-12-16 DIAGNOSIS — E66.811 OBESITY, CLASS I, BMI 30-34.9: ICD-10-CM

## 2024-12-16 DIAGNOSIS — Z12.31 ENCOUNTER FOR SCREENING MAMMOGRAM FOR BREAST CANCER: ICD-10-CM

## 2024-12-16 PROCEDURE — G8484 FLU IMMUNIZE NO ADMIN: HCPCS | Performed by: SURGERY

## 2024-12-16 PROCEDURE — 1036F TOBACCO NON-USER: CPT | Performed by: SURGERY

## 2024-12-16 PROCEDURE — 1126F AMNT PAIN NOTED NONE PRSNT: CPT | Performed by: SURGERY

## 2024-12-16 PROCEDURE — 99214 OFFICE O/P EST MOD 30 MIN: CPT | Performed by: SURGERY

## 2024-12-16 PROCEDURE — 1090F PRES/ABSN URINE INCON ASSESS: CPT | Performed by: SURGERY

## 2024-12-16 PROCEDURE — G8427 DOCREV CUR MEDS BY ELIG CLIN: HCPCS | Performed by: SURGERY

## 2024-12-16 PROCEDURE — 1159F MED LIST DOCD IN RCRD: CPT | Performed by: SURGERY

## 2024-12-16 PROCEDURE — G8417 CALC BMI ABV UP PARAM F/U: HCPCS | Performed by: SURGERY

## 2024-12-16 PROCEDURE — 1123F ACP DISCUSS/DSCN MKR DOCD: CPT | Performed by: SURGERY

## 2024-12-16 PROCEDURE — G8399 PT W/DXA RESULTS DOCUMENT: HCPCS | Performed by: SURGERY

## 2024-12-16 RX ORDER — EZETIMIBE 10 MG/1
10 TABLET ORAL DAILY
COMMUNITY
Start: 2024-12-03

## 2024-12-16 RX ORDER — FLUOROURACIL 50 MG/G
CREAM TOPICAL 2 TIMES DAILY
COMMUNITY
Start: 2024-10-02

## 2024-12-16 ASSESSMENT — ENCOUNTER SYMPTOMS
COLOR CHANGE: 0
CHEST TIGHTNESS: 0
NAUSEA: 0
SHORTNESS OF BREATH: 0
COUGH: 0
ABDOMINAL PAIN: 0
VOMITING: 0
SORE THROAT: 0

## 2024-12-16 NOTE — PROGRESS NOTES
Reason for today's visit:  6 month follow up right breast cancer    Palpates a breast change? Does not so self breast exams  Nipple discharge: no  Breast Pain: no  Breast Mass: no  Swelling in either upper extremity? no    Wellness:    Self breast self exams: no  Regular exercise routine: active  Following Lymphedema awareness/precautions: yes   Managing stress:yes   Stress level on a scale of 1 - 10: 1    Instruction:    Follow up per provider  Imaging per provider  Monthly self breast exams  Healthy diet  Exercise program  Lymphedema precautions/awareness    Other:    Requisitions needed:no  Bras/prosthesis: no  Compression Sleeve/glove: no  Lymphedema Measurements: see flow sheet  Therapy: no  PT/OT/Lymphedema: no  Follow up as advised per Dr Pardo      
Cervical back: Normal range of motion and neck supple.      Comments: Normal Range of motion in upper and lower extremities.   Lymphadenopathy:      Cervical: No cervical adenopathy.      Right cervical: No superficial, deep or posterior cervical adenopathy.     Left cervical: No superficial, deep or posterior cervical adenopathy.      Upper Body:      Right upper body: No supraclavicular, axillary or pectoral adenopathy.      Left upper body: No supraclavicular, axillary or pectoral adenopathy.   Skin:     General: Skin is warm and dry.      Findings: No abrasion, bruising, erythema or lesion.   Neurological:      Mental Status: She is alert and oriented to person, place, and time. She is not disoriented.   Psychiatric:         Speech: Speech normal.         Behavior: Behavior normal. Behavior is cooperative.         Thought Content: Thought content normal.         Judgment: Judgment normal.               IMAGING:     "[employer] BRENDAN DIGITAL SCREEN BILATERAL    Result Date: 11/21/2024  EXAMINATION: SCREENING DIGITAL BILATERAL MAMMOGRAM WITH TOMOSYNTHESIS, 11/20/2024 10:10 am TECHNIQUE: Screening mammography of the bilateral breasts was performed with tomosynthesis.  2D standard and 3D tomosynthesis combination imaging performed through both breasts in the MLO and CC projection.  Computer aided detection was utilized in the interpretation of this exam. COMPARISON: Multiple, most recent 2023 HISTORY: Screening. FINDINGS: There are scattered areas of fibroglandular density. There is no new dominant mass, suspicious microcalcification, or area of architectural distortion. There are benign postoperative changes in the right breast.     No mammographic evidence for malignancy. BIRADS: BIRADS - CATEGORY 2 Benign Findings.  Normal interval follow-up is recommended in 12 months. OVERALL ASSESSMENT - BENIGN A letter of notification will be sent to the patient regarding the results. The [employer] College [employer] recommends

## (undated) DEVICE — NEPTUNE E-SEP SMOKE EVACUATION PENCIL, COATED, 70MM BLADE, PUSH BUTTON SWITCH: Brand: NEPTUNE E-SEP

## (undated) DEVICE — ELECTRODE PT RET AD L9FT HI MOIST COND ADH HYDRGEL CORDED

## (undated) DEVICE — COUNTER NDL 40 COUNT HLD 70 FOAM BLK ADH W/ MAG

## (undated) DEVICE — COVER LT HNDL BLU PLAS

## (undated) DEVICE — 4-PORT MANIFOLD: Brand: NEPTUNE 2

## (undated) DEVICE — GOWN,AURORA,NONRNF,XL,30/CS: Brand: MEDLINE

## (undated) DEVICE — ADHESIVE SKIN CLSR 0.7ML TOP DERMBND ADV

## (undated) DEVICE — TOWEL,OR,DSP,ST,BLUE,STD,4/PK,20PK/CS: Brand: MEDLINE

## (undated) DEVICE — SUTURE MCRYL SZ 4-0 L27IN ABSRB UD L19MM PS-2 1/2 CIR PRIM Y426H

## (undated) DEVICE — SYRINGE MED 10ML LUERLOCK TIP W/O SFTY DISP

## (undated) DEVICE — PLASMABLADE X PS210-030S-LIGHT 3.0SL: Brand: PLASMABLADE™ X

## (undated) DEVICE — GOWN,SIRUS,NONRNF,SETINSLV,2XL,18/CS: Brand: MEDLINE

## (undated) DEVICE — GLOVE SURG SZ 65 THK91MIL LTX FREE SYN POLYISOPRENE

## (undated) DEVICE — INTENDED FOR TISSUE SEPARATION, AND OTHER PROCEDURES THAT REQUIRE A SHARP SURGICAL BLADE TO PUNCTURE OR CUT.: Brand: BARD-PARKER ® CARBON RIB-BACK BLADES

## (undated) DEVICE — SUTURE PERMA-HAND SZ 2-0 L30IN NONABSORBABLE BLK L26MM SH K833H

## (undated) DEVICE — GAUZE,SPONGE,FLUFF,6"X6.75",STRL,10/TRAY: Brand: MEDLINE

## (undated) DEVICE — SUTURE VCRL SZ 3-0 L27IN ABSRB UD L26MM SH 1/2 CIR J416H

## (undated) DEVICE — PACK,LAPAROTOMY,NO GOWNS: Brand: MEDLINE

## (undated) DEVICE — PROVE COVER: Brand: UNBRANDED

## (undated) DEVICE — MARKER SURG SKIN GENTIAN VLT REG TIP W/ 6IN RUL DYNJSM01

## (undated) DEVICE — GLOVE SURG SZ 6 L12IN FNGR THK79MIL GRN LTX FREE

## (undated) DEVICE — LABEL MED MINI W/ MARKER

## (undated) DEVICE — Z INACTIVE USE 2735373 APPLICATOR FBR LAIN COT WOOD TIP ECONOMICAL

## (undated) DEVICE — YANKAUER,BULB TIP,W/O VENT,RIGID,STERILE: Brand: MEDLINE

## (undated) DEVICE — SUTURE PERMAHAND SZ 3-0 L30IN NONABSORBABLE BLK SH L26MM K832H

## (undated) DEVICE — SPONGE,LAP,18"X18",DLX,XR,ST,5/PK,40/PK: Brand: MEDLINE

## (undated) DEVICE — LIQUIBAND RAPID ADHESIVE 36/CS 0.8ML: Brand: MEDLINE

## (undated) DEVICE — MARKER SURG SKIN GENTIAN VLT REG TIP W/ 6IN RUL

## (undated) DEVICE — GLOVE SURG SZ 55 THK91MIL ORANGE  LTX FREE SYN POLYISOPRENE

## (undated) DEVICE — GLOVE ORANGE PI 7   MSG9070

## (undated) DEVICE — C-ARM: Brand: UNBRANDED

## (undated) DEVICE — COVER,MAYO STAND,STERILE: Brand: MEDLINE

## (undated) DEVICE — SYRINGE IRRIG 60ML SFT PLIABLE BLB EZ TO GRP 1 HND USE W/

## (undated) DEVICE — GEL ULTRASOUND 20 GM STRL

## (undated) DEVICE — BRA SURGICALXL BGE MARENA FR SNAP CMFRT WR

## (undated) DEVICE — TUBING, SUCTION, 1/4" X 10', STRAIGHT: Brand: MEDLINE

## (undated) DEVICE — APPLICATOR MEDICATED 26 CC SOLUTION HI LT ORNG CHLORAPREP

## (undated) DEVICE — PAD,NON-ADHERENT,3X8,STERILE,LF,1/PK: Brand: MEDLINE

## (undated) DEVICE — SHEET,DRAPE,53X77,STERILE: Brand: MEDLINE

## (undated) DEVICE — BLADE ES ELASTOMERIC COAT INSUL DURABLE BEND UPTO 90DEG

## (undated) DEVICE — AGENT HEMSTAT 3GM OXIDIZED REGENERATED CELOS ABSRB FOR CONT (ORDER MULTIPLES OF 5EA)

## (undated) DEVICE — GOWN,AURORA,NONREINFORCED,LARGE: Brand: MEDLINE

## (undated) DEVICE — BLADE,CARBON-STEEL,15,STRL,DISPOSABLE,TB: Brand: MEDLINE

## (undated) DEVICE — SUTURE PERMAHAND SZ 2-0 L12X18IN NONABSORBABLE BLK SILK A185H

## (undated) DEVICE — DRAPE EQUIP TRNSPRT CONTAINMENT FOR BK TAB

## (undated) DEVICE — GOWN,SIRUS,POLYRNF,BRTHSLV,LG,30/CS: Brand: MEDLINE

## (undated) DEVICE — GLOVE ORANGE PI 8   MSG9080